# Patient Record
Sex: FEMALE | Employment: UNEMPLOYED | URBAN - METROPOLITAN AREA
[De-identification: names, ages, dates, MRNs, and addresses within clinical notes are randomized per-mention and may not be internally consistent; named-entity substitution may affect disease eponyms.]

---

## 2017-05-15 ENCOUNTER — HOSPITAL ENCOUNTER (OUTPATIENT)
Facility: CLINIC | Age: 37
End: 2017-05-15
Attending: FAMILY MEDICINE | Admitting: FAMILY MEDICINE

## 2017-06-06 ENCOUNTER — PRE VISIT (OUTPATIENT)
Dept: MATERNAL FETAL MEDICINE | Facility: CLINIC | Age: 37
End: 2017-06-06

## 2017-06-09 ENCOUNTER — OFFICE VISIT (OUTPATIENT)
Dept: MATERNAL FETAL MEDICINE | Facility: CLINIC | Age: 37
End: 2017-06-09
Attending: ADVANCED PRACTICE MIDWIFE
Payer: MEDICAID

## 2017-06-09 ENCOUNTER — HOSPITAL ENCOUNTER (OUTPATIENT)
Dept: ULTRASOUND IMAGING | Facility: CLINIC | Age: 37
Discharge: HOME | End: 2017-06-09
Attending: ADVANCED PRACTICE MIDWIFE | Admitting: OBSTETRICS & GYNECOLOGY
Payer: MEDICAID

## 2017-06-09 DIAGNOSIS — O09.521 AMA (ADVANCED MATERNAL AGE) MULTIGRAVIDA 35+, FIRST TRIMESTER: Primary | ICD-10-CM

## 2017-06-09 DIAGNOSIS — O26.90 PREGNANCY RELATED CONDITION: ICD-10-CM

## 2017-06-09 DIAGNOSIS — Z84.3 FAMILY HISTORY OF CONSANGUINITY: ICD-10-CM

## 2017-06-09 PROCEDURE — 76813 OB US NUCHAL MEAS 1 GEST: CPT

## 2017-06-09 PROCEDURE — 96040 ZZH GENETIC COUNSELING, EACH 30 MINUTES: CPT | Mod: ZF | Performed by: GENETIC COUNSELOR, MS

## 2017-06-09 NOTE — MR AVS SNAPSHOT
"              After Visit Summary   2017    Christine Glasgow    MRN: [id number]           Patient Information     Date Of Birth          1980        Visit Information        Provider Department      2017 12:45 PM Shayla Perla [facility] Maternal Fetal [facility]        Today's Diagnoses     AMA (advanced maternal age) multigravida 35+, first trimester    -  1    Pregnancy related condition        Family history of consanguinity           Follow-ups after your visit        Who to contact     If you have questions or need follow up information about today's clinic visit or your schedule please contact [facility] MATERNAL FETAL [facility] directly at [phone].  Normal or non-critical lab and imaging results will be communicated to you by OneWirehart, letter or phone within 4 business days after the clinic has received the results. If you do not hear from us within 7 days, please contact the clinic through OneWirehart or phone. If you have a critical or abnormal lab result, we will notify you by phone as soon as possible.  Submit refill requests through [employer] or call your pharmacy and they will forward the refill request to us. Please allow 3 business days for your refill to be completed.          Additional Information About Your Visit        OneWirehart Information     [employer] lets you send messages to your doctor, view your test results, renew your prescriptions, schedule appointments and more. To sign up, go to [email or web address] . Click on \"Log in\" on the left side of the screen, which will take you to the Welcome page. Then click on \"Sign up Now\" on the right side of the page.     You will be asked to enter the access code listed below, as well as some personal information. Please follow the directions to create your username and password.     Your access code is: [email or web address]  Expires: 2017  4:48 PM     Your access code will  in 90 days. If you need help or a " new code, please call your [place] clinic or [phone].        Care EveryWhere ID     This is your Care EveryWhere ID. This could be used by other organizations to access your [place] medical records  [id number]        Your Vitals Were     Last Period                   02/28/2017            Blood Pressure from Last 3 Encounters:   No data found for BP    Weight from Last 3 Encounters:   No data found for Wt              We Performed the Following     [facility] Genetic Counseling        Primary Care Provider Office Phone # Fax #    Sheryl Mary Delgado [phone]       [facility] [address] [place], [place] [address]E [facility] [address]        Thank you!     Thank you for choosing [facility]  for your care. Our goal is always to provide you with excellent care. Hearing back from our patients is one way we can continue to improve our services. Please take a few minutes to complete the written survey that you may receive in the mail after your visit with us. Thank you!             Your Updated Medication List - Protect others around you: Learn how to safely use, store and throw away your medicines at www.disposemymeds.org.      Notice  As of 6/9/2017  4:48 PM    You have not been prescribed any medications.

## 2017-06-09 NOTE — MR AVS SNAPSHOT
"              After Visit Summary   2017    Christine Glasgow    MRN: [id number]           Patient Information     Date Of Birth          1980        Visit Information        Provider Department      2017 2:00 PM Telma Olmos MD [facility] Maternal Fetal Medicine [facility]        Today's Diagnoses     AMA (advanced maternal age) multigravida 35+, first trimester    -  1       Follow-ups after your visit        Who to contact     If you have questions or need follow up information about today's clinic visit or your schedule please contact [facility] MATERNAL FETAL MEDICINE [facility] directly at [phone].  Normal or non-critical lab and imaging results will be communicated to you by [employer], letter or phone within 4 business days after the clinic has received the results. If you do not hear from us within 7 days, please contact the clinic through [employer] or phone. If you have a critical or abnormal lab result, we will notify you by phone as soon as possible.  Submit refill requests through [employer] or call your pharmacy and they will forward the refill request to us. Please allow 3 business days for your refill to be completed.          Additional Information About Your Visit        MyChart Information     Freespee lets you send messages to your doctor, view your test results, renew your prescriptions, schedule appointments and more. To sign up, go to [email or web address] . Click on \"Log in\" on the left side of the screen, which will take you to the Welcome page. Then click on \"Sign up Now\" on the right side of the page.     You will be asked to enter the access code listed below, as well as some personal information. Please follow the directions to create your username and password.     Your access code is: [email or web address]  Expires: 2017  4:48 PM     Your access code will  in 90 days. If you need help or a new code, please call your [place] clinic or [phone].        Care " EveryWhere ID     This is your Care EveryWhere ID. This could be used by other organizations to access your [place] medical records  [id number]        Your Vitals Were     Last Period                   02/28/2017            Blood Pressure from Last 3 Encounters:   No data found for BP    Weight from Last 3 Encounters:   No data found for Wt              Today, you had the following     No orders found for display       Primary Care Provider Office Phone # Fax #    Sheryl Mary RossichiquitaMINH alejandro [phone]       [facility] [address] [facility] [address]        Thank you!     Thank you for choosing [facility]  for your care. Our goal is always to provide you with excellent care. Hearing back from our patients is one way we can continue to improve our services. Please take a few minutes to complete the written survey that you may receive in the mail after your visit with us. Thank you!             Your Updated Medication List - Protect others around you: Learn how to safely use, store and throw away your medicines at www.disposemymeds.org.      Notice  As of 6/9/2017 11:59 PM    You have not been prescribed any medications.

## 2017-06-09 NOTE — PROGRESS NOTES
[facility] [place]  Genetic Counseling Consult    Patient: Christine Glasgow YOB: 1980   Date of Service: 17      Christine Glasgow was seen, along with her  Tad, with the aid of a  at [facility] [place] for genetic consultation to discuss the options for screening and testing for fetal chromosome abnormalities.  The indication for genetic counseling is advanced maternal age.        Impression/Plan:   1.  Christine had NT only assessment today.  She decided through our genetic counselling session that she would not be interested in prenatal diagnosis in a pregnancy and was worried that serum screening would return results that would cause unnecessary anxiety.  Therefore, she decided to proceed with ultrasound only.    2.  Maternal serum AFP (single marker screen) is recommended after 15 weeks to screen for open neural tube defects. A quad screen should not be performed.    3.  An 18-20 week comprehensive ultrasound is standard of care for all women 35 or older at delivery.    Pregnancy History:   /Parity:    Age at Delivery: 37 year old  TRISTAN: 2017, by Ultrasound  Gestational Age: 13w1d    No significant complications or exposures were reported in the current pregnancy.    Medical History:   Christine todd reported medical history is not expected to impact pregnancy management or risks to fetal development.       Family History:   A three-generation pedigree was obtained, and is scanned under the  Media  tab.   The following significant findings were reported by Christine:    Consanguinity was reported as Christine stated that she believes her maternal grandmother and Tad' paternal grandmother are first cousins.  This would make Christine and Tad 3rd cousins.  We discussed the increased possibility of two individuals both being carriers of the same autosomal recessive genetic condition when they  share a common ancestor. The option of expanded carrier screening was reviewed and declined.  We also reviewed that there is no increased empiric risk of birth defects/intellectual disability in children of individuals who are third cousins.      Otherwise, the reported family history is negative for multiple miscarriages, stillbirths, birth defects, mental retardation, known genetic conditions, and consanguinity.       Carrier Screening:   The patient reports that she and the father of the pregnancy have [place] ancestry:    Hemoglobinopathies/thalassemias are autosomal recessive genetic condition that occurs with increased frequency in individuals of this ancestry and carrier screening for this condition is available.  In addition,  screening in the [facility] includes these conditions.    Expanded carrier screening for mutations in a large panel of genes associated with autosomal recessive conditions including cystic fibrosis, spinal muscular atrophy, and others, is now available.      The patient has declined the carrier screening options reviewed today.       Risk Assessment for Chromosome Conditions:   We explained that the risk for fetal chromosome abnormalities increases with maternal age. We discussed specific features of common chromosome abnormalities, including Down syndrome, trisomy 13, trisomy 18, and sex chromosome trisomies.      - At age 36 at midtrimester, the risk to have a baby with Down syndrome is 1 in 216.     - At age 36 at midtrimester, the risk to have a baby with any chromosome abnormality is 1 in 105.          Testing Options:   We discussed the following options:   First trimester screening    First trimester ultrasound with nuchal translucency and nasal bone assessments, maternal plasma hCG, SAM-A, and AFP measurement    Screens for fetal trisomy 21, trisomy 13, and trisomy 18    Cannot screen for open neural tube defects; maternal serum AFP after 15 weeks is  recommended     Non-invasive Prenatal Testing (NIPT)    Maternal plasma cell-free DNA testing; first trimester ultrasound with nuchal translucency and nasal bone assessment is recommended, when appropriate    Screens for fetal trisomy 21, trisomy 13, trisomy 18, and sex chromosome aneuploidy    Cannot screen for open neural tube defects; maternal serum AFP after 15 weeks is recommended     Genetic Amniocentesis    Invasive procedure typically performed in the second trimester by which amniotic fluid is obtained for the purpose of chromosome analysis and/or other prenatal genetic analysis    Diagnostic results; >99% sensitivity for fetal chromosome abnormalities    AFAFP measurement tests for open neural tube defects     Comprehensive (Level II) ultrasound: Detailed ultrasound performed between 18-22 weeks gestation to screen for major birth defects and markers for aneuploidy.        We reviewed the benefits and limitations of this testing.  Screening tests provide a risk assessment specific to the pregnancy for certain fetal chromosome abnormalities, but cannot definitively diagnose or exclude a fetal chromosome abnormality.  Follow-up genetic counseling and consideration of diagnostic testing is recommended with any abnormal screening result.     Diagnostic tests carry inherent risks- including risk of miscarriage- that require careful consideration.  These tests can detect fetal chromosome abnormalities with greater than 99% certainty.  Results can be compromised by maternal cell contamination or mosaicism, and are limited by the resolution of cytogenetic G-banding technology.  There is no screening nor diagnostic test that can detect all forms of birth defects or mental disability.     It was a pleasure to be involved with [facility] care. Face-to-face time of the meeting was 45 minutes.    Shayla Perla, [facility]  Certified Genetic Counselor  Pager: [phone]

## 2017-07-28 ENCOUNTER — HOSPITAL ENCOUNTER (OUTPATIENT)
Dept: ULTRASOUND IMAGING | Facility: CLINIC | Age: 37
Discharge: HOME | End: 2017-07-28
Attending: ADVANCED PRACTICE MIDWIFE | Admitting: OBSTETRICS & GYNECOLOGY
Payer: COMMERCIAL

## 2017-07-28 ENCOUNTER — OFFICE VISIT (OUTPATIENT)
Dept: MATERNAL FETAL MEDICINE | Facility: CLINIC | Age: 37
End: 2017-07-28
Attending: ADVANCED PRACTICE MIDWIFE
Payer: COMMERCIAL

## 2017-07-28 ENCOUNTER — OFFICE VISIT (OUTPATIENT)
Dept: INTERPRETER SERVICES | Facility: CLINIC | Age: 37
End: 2017-07-28

## 2017-07-28 DIAGNOSIS — O26.90 PREGNANCY RELATED CONDITION, UNSPECIFIED TRIMESTER: ICD-10-CM

## 2017-07-28 DIAGNOSIS — O09.522 AMA (ADVANCED MATERNAL AGE) MULTIGRAVIDA 35+, SECOND TRIMESTER: Primary | ICD-10-CM

## 2017-07-28 PROCEDURE — T1013 SIGN LANG/ORAL INTERPRETER: HCPCS | Mod: U3,ZF

## 2017-07-28 PROCEDURE — 76811 OB US DETAILED SNGL FETUS: CPT

## 2017-07-28 NOTE — PROGRESS NOTES
Please see the imaging tab for details of the ultrasound performed today.    Ruth Romero MD  Specialist in Maternal-Fetal Medicine

## 2017-07-28 NOTE — MR AVS SNAPSHOT
"              After Visit Summary   2017    Christine Glasgow    MRN: [id number]           Patient Information     Date Of Birth          1980        Visit Information        Provider Department      2017 12:15 PM Ruth Romero MD [facility] Maternal Fetal Medicine [facility]        Today's Diagnoses     AMA (advanced maternal age) multigravida 35+, second trimester    -  1       Follow-ups after your visit        Who to contact     If you have questions or need follow up information about today's clinic visit or your schedule please contact [facility] MATERNAL FETAL MEDICINE [facility] directly at [phone].  Normal or non-critical lab and imaging results will be communicated to you by MyChart, letter or phone within 4 business days after the clinic has received the results. If you do not hear from us within 7 days, please contact the clinic through [employer] or phone. If you have a critical or abnormal lab result, we will notify you by phone as soon as possible.  Submit refill requests through [employer] or call your pharmacy and they will forward the refill request to us. Please allow 3 business days for your refill to be completed.          Additional Information About Your Visit        MyChart Information     [employer] lets you send messages to your doctor, view your test results, renew your prescriptions, schedule appointments and more. To sign up, go to [email or web address] . Click on \"Log in\" on the left side of the screen, which will take you to the Welcome page. Then click on \"Sign up Now\" on the right side of the page.     You will be asked to enter the access code listed below, as well as some personal information. Please follow the directions to create your username and password.     Your access code is: [email or web address]  Expires: 2017  4:48 PM     Your access code will  in 90 days. If you need help or a new code, please call your [place] clinic or [phone].        Care " EveryWhere ID     This is your Care EveryWhere ID. This could be used by other organizations to access your [place] medical records  [id number]        Your Vitals Were     Last Period                   02/28/2017            Blood Pressure from Last 3 Encounters:   No data found for BP    Weight from Last 3 Encounters:   No data found for Wt              Today, you had the following     No orders found for display       Primary Care Provider Office Phone # Fax #    Sheryl Delgado, [facility] [phone]       [facility] [address]  [facility] [address]        Equal Access to Services     [facility]: Hadii aad ku hadasho Soomaali, waaxda luqadaha, qaybta kaalmada adeegyada, waxay ramanin hayaan adeedi blount . So [facility] [phone].    ATENCIÓN: Si habla español, tiene a wilson disposición servicios gratuitos de asistencia lingüística. Paige al [phone].    We comply with applicable federal civil rights laws and [place] laws. We do not discriminate on the basis of race, color, national origin, age, disability sex, sexual orientation or gender identity.            Thank you!     Thank you for choosing [facility]  for your care. Our goal is always to provide you with excellent care. Hearing back from our patients is one way we can continue to improve our services. Please take a few minutes to complete the written survey that you may receive in the mail after your visit with us. Thank you!             Your Updated Medication List - Protect others around you: Learn how to safely use, store and throw away your medicines at www.disposemymeds.org.      Notice  As of 7/28/2017 12:52 PM    You have not been prescribed any medications.

## 2017-11-10 ENCOUNTER — HOSPITAL ENCOUNTER (OUTPATIENT)
Facility: CLINIC | Age: 37
Discharge: HOME | End: 2017-11-11
Attending: FAMILY MEDICINE | Admitting: FAMILY MEDICINE
Payer: COMMERCIAL

## 2017-11-10 ENCOUNTER — INTERPRETER (OUTPATIENT)
Dept: INTERPRETER SERVICES | Facility: CLINIC | Age: 37
End: 2017-11-10

## 2017-11-10 VITALS
SYSTOLIC BLOOD PRESSURE: 103 MMHG | RESPIRATION RATE: 18 BRPM | HEART RATE: 70 BPM | TEMPERATURE: 98 F | DIASTOLIC BLOOD PRESSURE: 63 MMHG

## 2017-11-10 DIAGNOSIS — R82.71 GBS BACTERIURIA: Primary | ICD-10-CM

## 2017-11-10 PROBLEM — Z36.89 ENCOUNTER FOR TRIAGE IN PREGNANT PATIENT: Status: ACTIVE | Noted: 2017-11-10

## 2017-11-10 LAB
ALBUMIN UR-MCNC: NEGATIVE MG/DL
APPEARANCE UR: CLEAR
BACTERIA #/AREA URNS HPF: ABNORMAL /HPF
BILIRUB UR QL STRIP: NEGATIVE
COLOR UR AUTO: YELLOW
GLUCOSE UR STRIP-MCNC: 150 MG/DL
HGB UR QL STRIP: NEGATIVE
KETONES UR STRIP-MCNC: NEGATIVE MG/DL
LEUKOCYTE ESTERASE UR QL STRIP: NEGATIVE
MUCOUS THREADS #/AREA URNS LPF: PRESENT /LPF
NITRATE UR QL: NEGATIVE
PH UR STRIP: 6 PH (ref 5–7)
RBC #/AREA URNS AUTO: <1 /HPF (ref 0–2)
SOURCE: ABNORMAL
SP GR UR STRIP: 1.01 (ref 1–1.03)
SPECIMEN SOURCE: NORMAL
SQUAMOUS #/AREA URNS AUTO: 1 /HPF (ref 0–1)
UROBILINOGEN UR STRIP-MCNC: NORMAL MG/DL (ref 0–2)
WBC #/AREA URNS AUTO: <1 /HPF (ref 0–2)
WET PREP SPEC: NORMAL

## 2017-11-10 PROCEDURE — 81001 URINALYSIS AUTO W/SCOPE: CPT | Performed by: ADVANCED PRACTICE MIDWIFE

## 2017-11-10 PROCEDURE — 87210 SMEAR WET MOUNT SALINE/INK: CPT | Performed by: FAMILY MEDICINE

## 2017-11-10 PROCEDURE — T1013 SIGN LANG/ORAL INTERPRETER: HCPCS | Mod: U3

## 2017-11-10 PROCEDURE — 87491 CHLMYD TRACH DNA AMP PROBE: CPT | Performed by: FAMILY MEDICINE

## 2017-11-10 PROCEDURE — 25000128 H RX IP 250 OP 636: Performed by: FAMILY MEDICINE

## 2017-11-10 PROCEDURE — 96374 THER/PROPH/DIAG INJ IV PUSH: CPT

## 2017-11-10 PROCEDURE — 87591 N.GONORRHOEAE DNA AMP PROB: CPT | Performed by: FAMILY MEDICINE

## 2017-11-10 PROCEDURE — 99214 OFFICE O/P EST MOD 30 MIN: CPT | Mod: 25

## 2017-11-10 PROCEDURE — 96360 HYDRATION IV INFUSION INIT: CPT

## 2017-11-10 PROCEDURE — 59025 FETAL NON-STRESS TEST: CPT

## 2017-11-10 RX ORDER — AMPICILLIN 500 MG/1
500 INJECTION, POWDER, FOR SOLUTION INTRAMUSCULAR; INTRAVENOUS ONCE
Status: COMPLETED | OUTPATIENT
Start: 2017-11-10 | End: 2017-11-10

## 2017-11-10 RX ORDER — ONDANSETRON 2 MG/ML
4 INJECTION INTRAMUSCULAR; INTRAVENOUS EVERY 6 HOURS PRN
Status: DISCONTINUED | OUTPATIENT
Start: 2017-11-10 | End: 2017-11-11 | Stop reason: HOSPADM

## 2017-11-10 RX ORDER — LIDOCAINE 40 MG/G
CREAM TOPICAL
Status: DISCONTINUED | OUTPATIENT
Start: 2017-11-10 | End: 2017-11-11 | Stop reason: HOSPADM

## 2017-11-10 RX ORDER — AMOXICILLIN 500 MG/1
500 CAPSULE ORAL 2 TIMES DAILY
Qty: 10 CAPSULE | Refills: 0 | Status: SHIPPED | OUTPATIENT
Start: 2017-11-10 | End: 2017-11-15

## 2017-11-10 RX ADMIN — SODIUM CHLORIDE, POTASSIUM CHLORIDE, SODIUM LACTATE AND CALCIUM CHLORIDE 500 ML: 600; 310; 30; 20 INJECTION, SOLUTION INTRAVENOUS at 22:12

## 2017-11-10 RX ADMIN — AMPICILLIN SODIUM 500 MG: 500 INJECTION, POWDER, FOR SOLUTION INTRAMUSCULAR; INTRAVENOUS at 22:51

## 2017-11-10 NOTE — IP AVS SNAPSHOT
MRN:[id number]                      After Visit Summary   11/10/2017    Christine Glasgow    MRN: [id number]           Thank you!     Thank you for choosing [facility] for your care. Our goal is always to provide you with excellent care. Hearing back from our patients is one way we can continue to improve our services. Please take a few minutes to complete the written survey that you may receive in the mail after you visit with us. Thank you!        Patient Information     Date Of Birth          1980        Designated Caregiver       Most Recent Value    Caregiver    Will someone help with your care after discharge? no      About your hospital stay     You were admitted on:  November 10, 2017 You last received care in the:  [facility]    You were discharged on:  2017       Who to Call     For medical emergencies, please call 911.  For non-urgent questions about your medical care, please call your primary care provider or clinic, [phone]          Attending Provider     Provider Specialty    Sofía Saxena MD Family Practice       Primary Care Provider Office Phone # Fax #    Sherylvolodymyr Dlegado [phone]      Further instructions from your care team       [facility]: [facility] .   Reason for maternal/fetal assessment per patient is Rule Out Labor  . Patient is a . Prenatal record reviewed.      Obstetric History       T3      L3     SAB0   TAB0   Ectopic0   Multiple0   Live Births0       # Outcome Date GA Lbr Leonel/2nd Weight Sex Delivery Anes PTL Lv   4 Current            3 Term            2 Term            1 Term                  Medical History: History reviewed. No pertinent past medical history.. Gestational Age 35w1d. VSS. Cervix: dilated to 3.5.  Fetal movement present. Patient denies cramping, vaginal discharge, pelvic pressure, GI problems, bloody show, vaginal bleeding, edema, headache, visual  "disturbances, epigastric or URQ pain, abdominal pain, rupture of membranes. Support persons present.  Action: Verbal consent for EFM. Triage assessment completed. EFM and [place] applied. Fetal assessment: Presumed adequate fetal oxygenation documented (see flow record). Patient instructed to report change in fetal movement, vaginal leaking of fluid or bleeding, abdominal pain, or any concerns related to the pregnancy to her nurse/physician.   Response: Dr. Saxena informed of patient arrival. Plan per provider is discharge home with oral antibiotics. Patient verbalized understanding of education and verbalized agreement with plan. Discharged ambulatory at  0030.      Pending Results     Date and Time Order Name Status Description    11/10/2017 2111 Chlamydia trachomatis PCR In process     11/10/2017 2111 Neisseria gonorrhoea PCR In process             Admission Information     Date & Time Provider Department Dept. Phone    11/10/2017 Sofía Saxena MD [facility] [phone]      Your Vitals Were     Blood Pressure Pulse Temperature Respirations Last Period       103/63 70 98  F (36.7  C) (Oral) 18 2017       PushCoin Information     PushCoin lets you send messages to your doctor, view your test results, renew your prescriptions, schedule appointments and more. To sign up, go to [email or web address] . Click on \"Log in\" on the left side of the screen, which will take you to the Welcome page. Then click on \"Sign up Now\" on the right side of the page.     You will be asked to enter the access code listed below, as well as some personal information. Please follow the directions to create your username and password.     Your access code is: [email or web address]  Expires: 2018 12:09 AM     Your access code will  in 90 days. If you need help or a new code, please call your [facility] or [phone].        Care EveryWhere ID     This is your Care EveryWhere ID. This could be used by other organizations to " access your [facility] medical records  [id number]        Equal Access to Services     ELIZABETHBERNARDINO JORGE : Hadii nydia schmid sallyjair Sorosales, waaxda luqadaha, qaybta kanayelida faithtarikkev, kathy dean jeromyfrandy troncosochristinabrigitte earl. So [facility] [phone].    ATENCIÓN: Si habla español, tiene a wilson disposición servicios gratuitos de asistencia lingüística. Llame al [phone].    We comply with applicable [facility] civil rights laws and [place] laws. We do not discriminate on the basis of race, color, national origin, age, disability, sex, sexual orientation, or gender identity.               Review of your medicines      START taking        Dose / Directions    amoxicillin 500 MG capsule   Commonly known as:  AMOXIL        Dose:  500 mg   Take 1 capsule (500 mg) by mouth 2 times daily for 5 days   Quantity:  10 capsule   Refills:  0            Where to get your medicines      These medications were sent to [facility] [address]  [address] [address]     Phone:  [phone]     amoxicillin 500 MG capsule               ANTIBIOTIC INSTRUCTION     You've Been Prescribed an Antibiotic - Now What?  Your healthcare team thinks that you or your loved one might have an infection. Some infections can be treated with antibiotics, which are powerful, life-saving drugs. Like all medications, antibiotics have side effects and should only be used when necessary. There are some important things you should know about your antibiotic treatment.      Your healthcare team may run tests before you start taking an antibiotic.    Your team may take samples (e.g., from your blood, urine or other areas) to run tests to look for bacteria. These test can be important to determine if you need an antibiotic at all and, if you do, which antibiotic will work best.      Within a few days, your healthcare team might change or even stop your antibiotic.    Your team may start you on an antibiotic  while they are working to find out what is making you sick.    Your team might change your antibiotic because test results show that a different antibiotic would be better to treat your infection.    In some cases, once your team has more information, they learn that you do not need an antibiotic at all. They may find out that you don't have an infection, or that the antibiotic you're taking won't work against your infection. For example, an infection caused by a virus can't be treated with antibiotics. Staying on an antibiotic when you don't need it is more likely to be harmful than helpful.      You may experience side effects from your antibiotic.    Like all medications, antibiotics have side effects. Some of these can be serious.    Let you healthcare team know if you have any known allergies when you are admitted to the hospital.    One significant side effect of nearly all antibiotics is the risk of severe and sometimes deadly diarrhea caused by Clostridium difficile (C. Difficile). This occurs when a person takes antibiotics because some good germs are destroyed. Antibiotic use allows C. diificile to take over, putting patients at high risk for this serious infection.    As a patient or caregiver, it is important to understand your or your loved one's antibiotic treatment. It is especially important for caregivers to speak up when patients can't speak for themselves. Here are some important questions to ask your healthcare team.    What infection is this antibiotic treating and how do you know I have that infection?    What side effects might occur from this antibiotic?    How long will I need to take this antibiotic?    Is it safe to take this antibiotic with other medications or supplements (e.g., vitamins) that I am taking?     Are there any special directions I need to know about taking this antibiotic? For example, should I take it with food?    How will I be monitored to know whether my infection is  responding to the antibiotic?    What tests may help to make sure the right antibiotic is prescribed for me?      Information provided by:  www.cdc.gov/getsmart  U.S. Department of Health and Human Services  Centers for disease Control and Prevention  National Center for Emerging and Zoonotic Infectious Diseases  Division of Healthcare Quality Promotion         Protect others around you: Learn how to safely use, store and throw away your medicines at www.disposemymeds.org.             Medication List: This is a list of all your medications and when to take them. Check marks below indicate your daily home schedule. Keep this list as a reference.      Medications           Morning Afternoon Evening Bedtime As Needed    amoxicillin 500 MG capsule   Commonly known as:  AMOXIL   Take 1 capsule (500 mg) by mouth 2 times daily for 5 days

## 2017-11-10 NOTE — IP AVS SNAPSHOT
[facility]    [address]    [place] [address]    Phone:  [phone]                                       After Visit Summary   11/10/2017    Christine Glasgow    MRN: [id number]           After Visit Summary Signature Page     I have received my discharge instructions, and my questions have been answered. I have discussed any challenges I see with this plan with the nurse or doctor.    ..........................................................................................................................................  Patient/Patient Representative Signature      ..........................................................................................................................................  Patient Representative Print Name and Relationship to Patient    ..................................................               ................................................  Date                                            Time    ..........................................................................................................................................  Reviewed by Signature/Title    ...................................................              ..............................................  Date                                                            Time

## 2017-11-11 PROBLEM — R82.71 GBS BACTERIURIA: Status: ACTIVE | Noted: 2017-11-11

## 2017-11-11 NOTE — PROVIDER NOTIFICATION
"   11/10/17 2300   Provider Notification   Provider Name/Title Dr. Saxena   Method of Notification Phone   Request Evaluate in Person   Notification Reason Status Update   IV antibiotics are now infusing. Pt states pain is less, less contractions seen on monitor. States she still has back pain but declines intervention; \"it's not that bad\". FHR cat 1. Pt educated on PTL and when to seek medical attention.  used for all teaching and patient care.   "

## 2017-11-11 NOTE — PROGRESS NOTES
Data: Patient presented to [facility] at .   Reason for maternal/fetal assessment per patient is Rule Out Labor  .  Patient is a . Prenatal record reviewed.      Obstetric History       T3      L3     SAB0   TAB0   Ectopic0   Multiple0   Live Births0       # Outcome Date GA Lbr Leonel/2nd Weight Sex Delivery Anes PTL Lv   4 Current            3 Term            2 Term            1 Term               . Medical history: History reviewed. No pertinent past medical history.. Gestational Age 35w1d. VSS. Fetal movement present. Patient states she was in the clinic today and her cervix was 3-4cm. Contractions started today and are getting more painful. She feels them in her lower abdomen and are more painful than menstrual cramps. Patient denies  backache, vaginal discharge, pelvic pressure, UTI symptoms, GI problems, bloody show, vaginal bleeding, edema, headache, visual disturbances, epigastric or URQ pain, rupture of membranes. Support persons present.  Action: Verbal consent for EFM. Triage assessment completed. EFM applied for  labor assessment. Uterine assessment TOCO. Fetal assessment: Presumed adequate fetal oxygenation documented (see flow record). Wet prep, urine, and GC/Clam collected and sent to lab.  Response: Dr. Saxena informed of patient arrival. Plan per provider is IV fluids and IV antibiotics to treat UTI. Patient verbalized agreement with plan. Patient  oriented to room and call light.

## 2017-11-11 NOTE — DISCHARGE INSTRUCTIONS
Data: [facility] .   Reason for maternal/fetal assessment per patient is Rule Out Labor  . Patient is a . Prenatal record reviewed.      Obstetric History       T3      L3     SAB0   TAB0   Ectopic0   Multiple0   Live Births0       # Outcome Date GA Lbr Leonel/2nd Weight Sex Delivery Anes PTL Lv   4 Current            3 Term            2 Term            1 Term                  Medical History: History reviewed. No pertinent past medical history.. Gestational Age 35w1d. VSS. Cervix: dilated to 3.5.  Fetal movement present. Patient denies cramping, vaginal discharge, pelvic pressure, GI problems, bloody show, vaginal bleeding, edema, headache, visual disturbances, epigastric or URQ pain, abdominal pain, rupture of membranes. Support persons present.  Action: Verbal consent for EFM. Triage assessment completed. EFM and [place] applied. Fetal assessment: Presumed adequate fetal oxygenation documented (see flow record). Patient instructed to report change in fetal movement, vaginal leaking of fluid or bleeding, abdominal pain, or any concerns related to the pregnancy to her nurse/physician.   Response: Dr. Saxena informed of patient arrival. Plan per provider is discharge home with oral antibiotics. Patient verbalized understanding of education and verbalized agreement with plan. Discharged ambulatory at  0030.

## 2017-11-11 NOTE — PROGRESS NOTES
[facility]  Ob Admit /Triage Note    Christine Glasgow MRN# [id number]   Age: 37 year old YOB: 1980     Date of Admission: 11/10/2017 10:31 PM  Date of service: 11/10/2017.       History of Present Illness (Resident / Clinician):   Christine Glasgow is a patient of Sheryl Delgado from  [facility].   She is a 37 year old  who is 35w1d pregnant with TRISTAN  Dec 14, 2017, by 8w0d ultrasound.     [facility] for [facility] out  labor.   She reports that she was seen in clinic earlier today and was found to be 3-4cm on a routine cervical check. Wet prep was negative.   She reports irregular mild contractions. She denies fluid leakage. She denies bleeding per vagina. Fetal movement is .normal. She has been experiencing urinary urgency since last week. Last week on  she presented to clinic with vaginal itching and dysuria, she was treated for a yeast infection with PO diflucan x1. Urine culture was also obtained. UC grew GBS and there was an attempt to communicate this result and the subsequent antibiotic prescription with patient, but patient never got the message and has not taken any antibiotics other than the diflucan. The dysuria has improved but the urinary urgency has persisted.     Her prenatal course has been uncomplicated.  She has no history of  labor or  birth. All previous deliveries were vaginal.     Visit performed with assistance of professional Thai interpretor.   Patient accompanied by her spouse and daughter    Prenatal labs   No results found for: ABO, RH, AS, HEPBANG, CHPCRT, GCPCRT, TREPAB, RUBELLAABIGG, HGB, HIV, GBS    GBS swab does not need to be collected because of positive UC on 17, she will need prophylactic antibiotics in labor         Obstetrical History:   She is a 37 year old   Her OB history:   Obstetric History       T3      L3      SAB0   TAB0   Ectopic0   Multiple0   Live Births0       # Outcome Date GA Lbr Leonel/2nd Weight Sex Delivery Anes PTL Lv   4 Current            3 Term            2 Term            1 Term                      Medications:     No current facility-administered medications on file prior to encounter.   No current outpatient prescriptions on file prior to encounter.         Allergies:   Review of patient's allergies indicates not on file.         Review of Systems:   CONSTITUTIONAL: denies fevers or fatigue  RESP: no significant cough, dyspnea with eating  CV: no chest pain, no palpitations, no new or worsening peripheral edema  GI: no nausea, no vomiting, no constipation, no diarrhea  : +  Urgency/frequency, no dysuria, no hematuria  MSKL: lower back pain         Physical Exam:   Vitals:   /63  Pulse 70  Temp 98  F (36.7  C) (Oral)  Resp 18  LMP 2017  0 lbs 0 oz  There is no height or weight on file to calculate BMI.    GEN: Awake, alert in no apparent distress   HEENT: grossly normal  BACK:  no costovertebral angle tenderness   ABDOMEN: gravid  PELVIC:  no fluid noted, no blood noted.  Cervix: 3/40/-3  EFW on Exam: 6lb    NST interpretation:  Ordered by  [employer]  Start [id number]   Stop time 2225  External Monitor, FHT: Baseline 140 bpm. Variability is  moderate (5-25 bpm),  Accelerations are Present, decelerations are Absent ., TOCO: Contractions every 2-11 w/ irritability. Category 1   Interpretation: reactive      Assessment and Plan:   Assessment:   Christine Glasgow is a 37 year old  at 35w1d not in labor.   Patient Active Problem List   Diagnosis     Encounter for triage in pregnant patient         Plan:   Rule out  labor  No cervical change from 2pm to 9pm, still 3cm  - Hydrated with 500ml LR and contractions/pain improved  - instructed to keep hydrated with PO fluids    GBS Bacteruria  Despite negative UA today and resolution of dysuria, her bacteruria was never treated, so she  was given 1 dose of 500mg ampicillin inpatient and discharged with 5 days of BID amoxicillin 500mg    Sofía Saxena MD   [facility]

## 2017-11-11 NOTE — PLAN OF CARE
Christine Glasgow admitted for evaluation and treatment of  Labor .    VitalsBlood pressure 103/63, pulse 70, temperature 98  F (36.7  C), temperature source Oral, resp. rate 18, last menstrual period 2017.  Fetal status: Reactive.  UA tests were done, results: negative.    Medications given during stay: 1 dose of 500 mg ampicillin inpatient and discharged with 5 days of BID amoxicillin 500 mg.   Written instructions given to patient. Copy in electronic medical record.  Discharged Home undelivered at 12:30 am in stable condition.  Patient will follow up with primary care provider at next scheduled appointment.

## 2017-11-12 LAB
C TRACH DNA SPEC QL NAA+PROBE: NEGATIVE
N GONORRHOEA DNA SPEC QL NAA+PROBE: NEGATIVE
SPECIMEN SOURCE: NORMAL
SPECIMEN SOURCE: NORMAL

## 2017-12-08 ENCOUNTER — HOSPITAL ENCOUNTER (INPATIENT)
Facility: CLINIC | Age: 37
LOS: 2 days | Discharge: HOME HEALTH | End: 2017-12-10
Attending: FAMILY MEDICINE | Admitting: FAMILY MEDICINE
Payer: COMMERCIAL

## 2017-12-08 ENCOUNTER — APPOINTMENT (OUTPATIENT)
Dept: INTERPRETER SERVICES | Facility: CLINIC | Age: 37
End: 2017-12-08
Payer: COMMERCIAL

## 2017-12-08 ENCOUNTER — OFFICE VISIT (OUTPATIENT)
Dept: INTERPRETER SERVICES | Facility: CLINIC | Age: 37
End: 2017-12-08
Payer: COMMERCIAL

## 2017-12-08 PROBLEM — Z37.9 NORMAL LABOR: Status: ACTIVE | Noted: 2017-12-08

## 2017-12-08 LAB
ABO + RH BLD: NORMAL
ABO + RH BLD: NORMAL
BASOPHILS # BLD AUTO: 0 10E9/L (ref 0–0.2)
BASOPHILS NFR BLD AUTO: 0 %
DIFFERENTIAL METHOD BLD: NORMAL
EOSINOPHIL # BLD AUTO: 0 10E9/L (ref 0–0.7)
EOSINOPHIL NFR BLD AUTO: 0.4 %
ERYTHROCYTE [DISTWIDTH] IN BLOOD BY AUTOMATED COUNT: 12.7 % (ref 10–15)
HCT VFR BLD AUTO: 36.7 % (ref 35–47)
HGB BLD-MCNC: 12.4 G/DL (ref 11.7–15.7)
IMM GRANULOCYTES # BLD: 0 10E9/L (ref 0–0.4)
IMM GRANULOCYTES NFR BLD: 0.2 %
LYMPHOCYTES # BLD AUTO: 1.2 10E9/L (ref 0.8–5.3)
LYMPHOCYTES NFR BLD AUTO: 14.9 %
MCH RBC QN AUTO: 31.2 PG (ref 26.5–33)
MCHC RBC AUTO-ENTMCNC: 33.8 G/DL (ref 31.5–36.5)
MCV RBC AUTO: 92 FL (ref 78–100)
MONOCYTES # BLD AUTO: 0.6 10E9/L (ref 0–1.3)
MONOCYTES NFR BLD AUTO: 6.9 %
NEUTROPHILS # BLD AUTO: 6.5 10E9/L (ref 1.6–8.3)
NEUTROPHILS NFR BLD AUTO: 77.6 %
NRBC # BLD AUTO: 0 10*3/UL
NRBC BLD AUTO-RTO: 0 /100
PLATELET # BLD AUTO: 181 10E9/L (ref 150–450)
RBC # BLD AUTO: 3.98 10E12/L (ref 3.8–5.2)
SPECIMEN EXP DATE BLD: NORMAL
WBC # BLD AUTO: 8.3 10E9/L (ref 4–11)

## 2017-12-08 PROCEDURE — 12000030 ZZH R&B OB INTERMEDIATE UMMC

## 2017-12-08 PROCEDURE — 72200001 ZZH LABOR CARE VAGINAL DELIVERY SINGLE

## 2017-12-08 PROCEDURE — 36415 COLL VENOUS BLD VENIPUNCTURE: CPT | Performed by: FAMILY MEDICINE

## 2017-12-08 PROCEDURE — 85025 COMPLETE CBC W/AUTO DIFF WBC: CPT | Performed by: FAMILY MEDICINE

## 2017-12-08 PROCEDURE — 86900 BLOOD TYPING SEROLOGIC ABO: CPT | Performed by: FAMILY MEDICINE

## 2017-12-08 PROCEDURE — 86780 TREPONEMA PALLIDUM: CPT | Performed by: FAMILY MEDICINE

## 2017-12-08 PROCEDURE — 25000125 ZZHC RX 250: Performed by: FAMILY MEDICINE

## 2017-12-08 PROCEDURE — T1013 SIGN LANG/ORAL INTERPRETER: HCPCS | Mod: U3

## 2017-12-08 PROCEDURE — 99215 OFFICE O/P EST HI 40 MIN: CPT

## 2017-12-08 PROCEDURE — 25000132 ZZH RX MED GY IP 250 OP 250 PS 637: Performed by: FAMILY MEDICINE

## 2017-12-08 PROCEDURE — 25000128 H RX IP 250 OP 636: Performed by: FAMILY MEDICINE

## 2017-12-08 PROCEDURE — 86901 BLOOD TYPING SEROLOGIC RH(D): CPT | Performed by: FAMILY MEDICINE

## 2017-12-08 RX ORDER — MISOPROSTOL 200 UG/1
TABLET ORAL
Status: DISCONTINUED
Start: 2017-12-08 | End: 2017-12-08 | Stop reason: WASHOUT

## 2017-12-08 RX ORDER — ACETAMINOPHEN 325 MG/1
650 TABLET ORAL EVERY 4 HOURS PRN
Status: DISCONTINUED | OUTPATIENT
Start: 2017-12-08 | End: 2017-12-08

## 2017-12-08 RX ORDER — NALBUPHINE HYDROCHLORIDE 10 MG/ML
10-20 INJECTION, SOLUTION INTRAMUSCULAR; INTRAVENOUS; SUBCUTANEOUS
Status: DISCONTINUED | OUTPATIENT
Start: 2017-12-08 | End: 2017-12-08

## 2017-12-08 RX ORDER — ONDANSETRON 2 MG/ML
4 INJECTION INTRAMUSCULAR; INTRAVENOUS EVERY 6 HOURS PRN
Status: DISCONTINUED | OUTPATIENT
Start: 2017-12-08 | End: 2017-12-08

## 2017-12-08 RX ORDER — CARBOPROST TROMETHAMINE 250 UG/ML
250 INJECTION, SOLUTION INTRAMUSCULAR
Status: DISCONTINUED | OUTPATIENT
Start: 2017-12-08 | End: 2017-12-08

## 2017-12-08 RX ORDER — METHYLERGONOVINE MALEATE 0.2 MG/ML
200 INJECTION INTRAVENOUS
Status: DISCONTINUED | OUTPATIENT
Start: 2017-12-08 | End: 2017-12-10 | Stop reason: HOSPADM

## 2017-12-08 RX ORDER — FENTANYL CITRATE 50 UG/ML
50-100 INJECTION, SOLUTION INTRAMUSCULAR; INTRAVENOUS
Status: DISCONTINUED | OUTPATIENT
Start: 2017-12-08 | End: 2017-12-08

## 2017-12-08 RX ORDER — BISACODYL 10 MG
10 SUPPOSITORY, RECTAL RECTAL DAILY PRN
Status: DISCONTINUED | OUTPATIENT
Start: 2017-12-10 | End: 2017-12-10 | Stop reason: HOSPADM

## 2017-12-08 RX ORDER — OXYTOCIN/0.9 % SODIUM CHLORIDE 30/500 ML
100-340 PLASTIC BAG, INJECTION (ML) INTRAVENOUS CONTINUOUS PRN
Status: COMPLETED | OUTPATIENT
Start: 2017-12-08 | End: 2017-12-08

## 2017-12-08 RX ORDER — OXYCODONE AND ACETAMINOPHEN 5; 325 MG/1; MG/1
1 TABLET ORAL
Status: DISCONTINUED | OUTPATIENT
Start: 2017-12-08 | End: 2017-12-08

## 2017-12-08 RX ORDER — NALOXONE HYDROCHLORIDE 0.4 MG/ML
.1-.4 INJECTION, SOLUTION INTRAMUSCULAR; INTRAVENOUS; SUBCUTANEOUS
Status: DISCONTINUED | OUTPATIENT
Start: 2017-12-08 | End: 2017-12-08

## 2017-12-08 RX ORDER — METHYLERGONOVINE MALEATE 0.2 MG/ML
200 INJECTION INTRAVENOUS
Status: DISCONTINUED | OUTPATIENT
Start: 2017-12-08 | End: 2017-12-08

## 2017-12-08 RX ORDER — AMOXICILLIN 250 MG
1 CAPSULE ORAL 2 TIMES DAILY PRN
Status: DISCONTINUED | OUTPATIENT
Start: 2017-12-08 | End: 2017-12-10 | Stop reason: HOSPADM

## 2017-12-08 RX ORDER — IBUPROFEN 800 MG/1
800 TABLET, FILM COATED ORAL EVERY 6 HOURS PRN
Status: DISCONTINUED | OUTPATIENT
Start: 2017-12-08 | End: 2017-12-10 | Stop reason: HOSPADM

## 2017-12-08 RX ORDER — NALOXONE HYDROCHLORIDE 0.4 MG/ML
.1-.4 INJECTION, SOLUTION INTRAMUSCULAR; INTRAVENOUS; SUBCUTANEOUS
Status: DISCONTINUED | OUTPATIENT
Start: 2017-12-08 | End: 2017-12-10 | Stop reason: HOSPADM

## 2017-12-08 RX ORDER — HYDROCORTISONE 2.5 %
CREAM (GRAM) TOPICAL 3 TIMES DAILY PRN
Status: DISCONTINUED | OUTPATIENT
Start: 2017-12-08 | End: 2017-12-10 | Stop reason: HOSPADM

## 2017-12-08 RX ORDER — AMOXICILLIN 250 MG
2 CAPSULE ORAL 2 TIMES DAILY PRN
Status: DISCONTINUED | OUTPATIENT
Start: 2017-12-08 | End: 2017-12-10 | Stop reason: HOSPADM

## 2017-12-08 RX ORDER — OXYTOCIN/0.9 % SODIUM CHLORIDE 30/500 ML
100 PLASTIC BAG, INJECTION (ML) INTRAVENOUS CONTINUOUS
Status: DISCONTINUED | OUTPATIENT
Start: 2017-12-08 | End: 2017-12-10 | Stop reason: HOSPADM

## 2017-12-08 RX ORDER — ACETAMINOPHEN 325 MG/1
650 TABLET ORAL EVERY 4 HOURS PRN
Status: DISCONTINUED | OUTPATIENT
Start: 2017-12-08 | End: 2017-12-10 | Stop reason: HOSPADM

## 2017-12-08 RX ORDER — LANOLIN 100 %
OINTMENT (GRAM) TOPICAL
Status: DISCONTINUED | OUTPATIENT
Start: 2017-12-08 | End: 2017-12-10 | Stop reason: HOSPADM

## 2017-12-08 RX ORDER — OXYTOCIN 10 [USP'U]/ML
10 INJECTION, SOLUTION INTRAMUSCULAR; INTRAVENOUS
Status: DISCONTINUED | OUTPATIENT
Start: 2017-12-08 | End: 2017-12-08

## 2017-12-08 RX ORDER — OXYTOCIN 10 [USP'U]/ML
10 INJECTION, SOLUTION INTRAMUSCULAR; INTRAVENOUS
Status: DISCONTINUED | OUTPATIENT
Start: 2017-12-08 | End: 2017-12-10 | Stop reason: HOSPADM

## 2017-12-08 RX ORDER — MISOPROSTOL 200 UG/1
400 TABLET ORAL
Status: DISCONTINUED | OUTPATIENT
Start: 2017-12-08 | End: 2017-12-10 | Stop reason: HOSPADM

## 2017-12-08 RX ORDER — METOCLOPRAMIDE HYDROCHLORIDE 5 MG/ML
10 INJECTION INTRAMUSCULAR; INTRAVENOUS EVERY 6 HOURS PRN
Status: DISCONTINUED | OUTPATIENT
Start: 2017-12-08 | End: 2017-12-08

## 2017-12-08 RX ORDER — OXYTOCIN/0.9 % SODIUM CHLORIDE 30/500 ML
PLASTIC BAG, INJECTION (ML) INTRAVENOUS
Status: DISCONTINUED
Start: 2017-12-08 | End: 2017-12-08 | Stop reason: HOSPADM

## 2017-12-08 RX ORDER — CARBOPROST TROMETHAMINE 250 UG/ML
250 INJECTION, SOLUTION INTRAMUSCULAR
Status: DISCONTINUED | OUTPATIENT
Start: 2017-12-08 | End: 2017-12-10 | Stop reason: HOSPADM

## 2017-12-08 RX ORDER — SODIUM CHLORIDE, SODIUM LACTATE, POTASSIUM CHLORIDE, CALCIUM CHLORIDE 600; 310; 30; 20 MG/100ML; MG/100ML; MG/100ML; MG/100ML
INJECTION, SOLUTION INTRAVENOUS CONTINUOUS
Status: DISCONTINUED | OUTPATIENT
Start: 2017-12-08 | End: 2017-12-08

## 2017-12-08 RX ORDER — OXYTOCIN/0.9 % SODIUM CHLORIDE 30/500 ML
340 PLASTIC BAG, INJECTION (ML) INTRAVENOUS CONTINUOUS PRN
Status: DISCONTINUED | OUTPATIENT
Start: 2017-12-08 | End: 2017-12-10 | Stop reason: HOSPADM

## 2017-12-08 RX ORDER — LIDOCAINE HYDROCHLORIDE 10 MG/ML
INJECTION, SOLUTION EPIDURAL; INFILTRATION; INTRACAUDAL; PERINEURAL
Status: DISCONTINUED
Start: 2017-12-08 | End: 2017-12-08 | Stop reason: WASHOUT

## 2017-12-08 RX ORDER — IBUPROFEN 800 MG/1
800 TABLET, FILM COATED ORAL
Status: COMPLETED | OUTPATIENT
Start: 2017-12-08 | End: 2017-12-08

## 2017-12-08 RX ORDER — PENICILLIN G POTASSIUM 5000000 [IU]/1
5 INJECTION, POWDER, FOR SOLUTION INTRAMUSCULAR; INTRAVENOUS ONCE
Status: COMPLETED | OUTPATIENT
Start: 2017-12-08 | End: 2017-12-08

## 2017-12-08 RX ORDER — OXYTOCIN 10 [USP'U]/ML
INJECTION, SOLUTION INTRAMUSCULAR; INTRAVENOUS
Status: DISCONTINUED
Start: 2017-12-08 | End: 2017-12-08 | Stop reason: WASHOUT

## 2017-12-08 RX ORDER — PRENATAL VIT/IRON FUM/FOLIC AC 27MG-0.8MG
1 TABLET ORAL DAILY
COMMUNITY

## 2017-12-08 RX ORDER — FERROUS SULFATE 325(65) MG
325 TABLET ORAL
Status: ON HOLD | COMMUNITY
End: 2017-12-09

## 2017-12-08 RX ADMIN — SODIUM CHLORIDE, POTASSIUM CHLORIDE, SODIUM LACTATE AND CALCIUM CHLORIDE: 600; 310; 30; 20 INJECTION, SOLUTION INTRAVENOUS at 13:50

## 2017-12-08 RX ADMIN — SENNOSIDES AND DOCUSATE SODIUM 2 TABLET: 8.6; 5 TABLET ORAL at 20:32

## 2017-12-08 RX ADMIN — ACETAMINOPHEN 650 MG: 325 TABLET, FILM COATED ORAL at 19:01

## 2017-12-08 RX ADMIN — PENICILLIN G POTASSIUM 5 MILLION UNITS: 5000000 POWDER, FOR SOLUTION INTRAMUSCULAR; INTRAPLEURAL; INTRATHECAL; INTRAVENOUS at 13:50

## 2017-12-08 RX ADMIN — ACETAMINOPHEN 650 MG: 325 TABLET, FILM COATED ORAL at 23:47

## 2017-12-08 RX ADMIN — IBUPROFEN 800 MG: 800 TABLET ORAL at 17:35

## 2017-12-08 RX ADMIN — OXYTOCIN-SODIUM CHLORIDE 0.9% IV SOLN 30 UNIT/500ML 340 ML/HR: 30-0.9/5 SOLUTION at 17:01

## 2017-12-08 NOTE — IP AVS SNAPSHOT
MRN:[id number]                      After Visit Summary   12/8/2017    Christine Glasgow    MRN: [id number]           Thank you!     Thank you for choosing [facility] for your care. Our goal is always to provide you with excellent care. Hearing back from our patients is one way we can continue to improve our services. Please take a few minutes to complete the written survey that you may receive in the mail after you visit with us. Thank you!        Patient Information     Date Of Birth          1980        Designated Caregiver       Most Recent Value    Caregiver    Will someone help with your care after discharge? no      About your hospital stay     You were admitted on:  December 8, 2017 You last received care in the:  [facility]    You were discharged on:  December 10, 2017        Reason for your hospital stay       Maternity care                  Who to Call     For medical emergencies, please call 911.  For non-urgent questions about your medical care, please call your primary care provider or clinic, [phone]          Attending Provider     Provider Specialty    Ines Infante DO [facility]       Primary Care Provider Office Phone # Fax #    Sheryl Mary Delgado [phone]      After Care Instructions     Activity       Review discharge instructions            Diet       Resume previous diet            Discharge Instructions - Gestational diabetic patients       Gestational diabetic patients to follow up for fasting blood sugar and 2 hour 75gm glucose load at 6 weeks postpartum.            Discharge Instructions - Hypertensive disorders patients       High Blood pressure patients to follow up in clinic or via home care within one week for a blood pressure check            Discharge Instructions - Postpartum visit       Schedule postpartum visit with your provider and return to clinic in 6 weeks.                  Follow-up Appointments     Follow Up and  recommended labs and tests       Follow up with primary care provider, Sheryl Delgado, in 6 weeks.                  Further instructions from your care team       Vaginal Delivery Discharge Instructions: Armenian  Actividad:     Pida a los miembros de wilson idalia y amigos que la ayuden cuando lo necesite.    No ponga nada en wilson vagina hasta que wilson médico lo permita.    Tómese las próximas semanas con calma para que wilson cuerpo tenga tiempo de recuperarse. En juan pablo momento puede hacer cualquier actividad que sienta que puede.    No conduzca si está tomando píldoras para el dolor recetadas por wilson médico. Puede conducir si está tomando píldoras de venta ehsan para el dolor.    Llame a wilson proveedor de atención médica si tiene alguno de estos síntomas:    Empapa deonte toalla femenina con sukhwinder en el correr de 1 hora o ve coágulos más grandes que deonte pelota de golf.    Sangrado que dura más de 6 semanas.    Tiene deonte secreción vaginal que huele mal.     Fiebre de 100.4  F (38  C) o más (temperatura tomada bajo wilson lengua) con o sin escalofríos     Dolor, calambres o sensibilidad graves en la región inferior de wilson vientre.    Aumento del dolor, hinchazón, enrojecimiento o líquido alrededor de suzie puntos.    Necesidad más frecuente o urgente de orinar (hacer pis), o ardor al hacerlo.    Enrojecimiento, hinchazón o dolor alrededor de deonte vena en wilson pierna.    Problemas para amamantar o un área enrojecida o dolorosa en wilson pecho.    Dolor que aumenta o no se va de deonte episiotomía o desgarro en el perineo.    Náuseas y vómitos    Dolor en el pecho y tos o dificultad para respirar.    Problemas para manejar la tristeza, ansiedad o depresión.     Si le preocupa hacerse daño o hacerle daño al bebé, llame al médico de inmediato.     Tiene preguntas o inquietudes después de regresar a casa.    Mantenga suzie gregory limpias:  Lávese siempre las gregory antes de tocar el área de wilson perineo y los puntos.  Roberta ayuda a reducir wilson riesgo de  infección.  Si suzie gregory no están sucias, puede usar un gel de alcohol para limpiarse las gregory. Mantenga suzie uñas cortas y limpias.      Vaginal Delivery Discharge Instructions  Activity:     Ask family and friends for help when you need it.    Do not place anything in your vagina until your doctor approves.    Take it easy for the next few weeks to allow your body to recover. You may do any activities you feel up to at that point.    Do not drive while taking pain pills prescribed by your doctor. You may drive if taking over-the-counter pain pills.    Call your health care provider if you have any of these symptoms:    You soak a sanitary pad with blood within 1 hour, or you see blood clots larger than a golf ball.    Bleeding that lasts more than 6 weeks.    You have vaginal discharge that smells bad.     A fever of 100.4  F (38  C) or higher (temperature taken under your tongue), with or without chills     Severe, pain, cramping or tenderness in your lower belly area.    Increased pain, swelling, redness or fluid around your stitches.    A more frequent or urgent need to urinate (pee), or it burns when you pee.    Redness, swelling or pain around a vein in your leg.    Problems breastfeeding, or a red or painful area on your breast.    Pain that increases or does not go away from an episiotomy or perineal tear.    Nausea and vomiting.    Chest pain and cough or are gasping for air.    Problems coping with sadness, anxiety, or depression.     If you have any concerns about hurting yourself or the baby, call your doctor right away.     You have questions or concerns after you return home.    Keep your hands clean:  Always wash your hands before touching your perineal area and stitches.  This helps reduce your risk of infection.  If your hands aren t dirty, you may use an alcohol hand-rub to clean your hands. Keep your nails clean and short.        Pending Results     No orders found from 12/6/2017 to 12/9/2017.     "        Statement of Approval     Ordered          12/10/17 1116  I have reviewed and agree with all the recommendations and orders detailed in this document.  EFFECTIVE NOW     Approved and electronically signed by:  Joycelyn Stahl MD             Admission Information     Date & Time Provider Department Dept. Phone    2017 Ines Infante DO [facility] [phone]      Your Vitals Were     Blood Pressure Pulse Temperature Respirations Last Period       103/56 74 98.4  F (36.9  C) (Oral) 16 2017       MyChart Information     [facility] lets you send messages to your doctor, view your test results, renew your prescriptions, schedule appointments and more. To sign up, go to [email or web address] . Click on \"Log in\" on the left side of the screen, which will take you to the Welcome page. Then click on \"Sign up Now\" on the right side of the page.     You will be asked to enter the access code listed below, as well as some personal information. Please follow the directions to create your username and password.     Your access code is: [email or web address]  Expires: 2018 12:09 AM     Your access code will  in 90 days. If you need help or a new code, please call your [facility] clinic or [phone].        Care EveryWhere ID     This is your Care EveryWhere ID. This could be used by other organizations to access your [facility] medical records  [id number]        Equal Access to Services     [facility] Hospital[facility]IGUEL AH: Hadii nydia schmid hadasho Somartinali, waaxda luqadaha, qaybta kaalmada adeegyada, kathy blount . So [facility] [phone].    ATENCIÓN: Si habla español, tiene a wilson disposición servicios gratuitos de asistencia lingüística. Llame al [phone].    We comply with applicable federal civil rights laws and [place] laws. We do not discriminate on the basis of race, color, national origin, age, disability, sex, sexual orientation, or gender identity.               Review of your medicines    "   START taking        Dose / Directions    acetaminophen 325 MG tablet   Commonly known as:  TYLENOL   Used for:   (spontaneous vaginal delivery)        Dose:  650 mg   Take 2 tablets (650 mg) by mouth every 4 hours as needed for mild pain   Quantity:  100 tablet   Refills:  0       ibuprofen 800 MG tablet   Commonly known as:  ADVIL/MOTRIN        Dose:  800 mg   Take 1 tablet (800 mg) by mouth every 6 hours as needed for other (cramping)   Quantity:  90 tablet   Refills:  0       senna-docusate 8.6-50 MG per tablet   Commonly known as:  SENOKOT-S;PERICOLACE        Dose:  1 tablet   Take 1 tablet by mouth 2 times daily as needed for constipation   Quantity:  100 tablet   Refills:  0         CONTINUE these medicines which have NOT CHANGED        Dose / Directions    ferrous sulfate 325 (65 FE) MG tablet   Commonly known as:  IRON        Dose:  325 mg   Take 1 tablet (325 mg) by mouth daily (with breakfast)   Quantity:  100 tablet   Refills:  0       prenatal multivitamin plus iron 27-0.8 MG Tabs per tablet        Dose:  1 tablet   Take 1 tablet by mouth daily   Refills:  0       VITAMIN D (CHOLECALCIFEROL) PO        Dose:  2000 Units   Take 2,000 Units by mouth daily   Refills:  0            Where to get your medicines      These medications were sent to [facility] [place], [place] - [address]  [address] [address]     Phone:  [phone]     acetaminophen 325 MG tablet    ferrous sulfate 325 (65 FE) MG tablet    ibuprofen 800 MG tablet    senna-docusate 8.6-50 MG per tablet                Protect others around you: Learn how to safely use, store and throw away your medicines at www.disposemymeds.org.             Medication List: This is a list of all your medications and when to take them. Check marks below indicate your daily home schedule. Keep this list as a reference.      Medications           Morning Afternoon Evening Bedtime As Needed    acetaminophen 325 MG  tablet   Commonly known as:  TYLENOL   Take 2 tablets (650 mg) by mouth every 4 hours as needed for mild pain   Last time this was given:  650 mg on 12/9/2017  3:39 AM                                ferrous sulfate 325 (65 FE) MG tablet   Commonly known as:  IRON   Take 1 tablet (325 mg) by mouth daily (with breakfast)                                ibuprofen 800 MG tablet   Commonly known as:  ADVIL/MOTRIN   Take 1 tablet (800 mg) by mouth every 6 hours as needed for other (cramping)   Last time this was given:  800 mg on 12/10/2017  5:00 AM                                prenatal multivitamin plus iron 27-0.8 MG Tabs per tablet   Take 1 tablet by mouth daily                                senna-docusate 8.6-50 MG per tablet   Commonly known as:  SENOKOT-S;PERICOLACE   Take 1 tablet by mouth 2 times daily as needed for constipation   Last time this was given:  2 tablets on 12/9/2017  7:44 PM                                VITAMIN D (CHOLECALCIFEROL) PO   Take 2,000 Units by mouth daily

## 2017-12-08 NOTE — PLAN OF CARE
Data: Patient presented to [facility] at 1250.   Reason for maternal/fetal assessment per patient is Rule out rupture of membranes  .  Patient is a . Prenatal record reviewed.      Obstetric History       T3      L3     SAB0   TAB0   Ectopic0   Multiple0   Live Births0       # Outcome Date GA Lbr Leonel/2nd Weight Sex Delivery Anes PTL Lv   4 Current            3 Term            2 Term            1 Term               . Medical history: History reviewed. No pertinent past medical history.. Gestational Age 39w1d. VSS. Fetal movement present. Patient denies  backache, vaginal discharge, pelvic pressure, UTI symptoms, GI problems, vaginal bleeding, edema, headache, visual disturbances, epigastric or URQ pain, abdominal pain. Support persons are not present.  on his way  Action: Verbal consent for EFM. Triage assessment completed. EFM applied upon admission. Uterine assessment reveals contractions every 5-7 minutes. Fetal assessment: Presumed adequate fetal oxygenation documented (see flow record).   Response: Dr. Infante informed of patient arrival. Plan per provider is admission to labor and delivery, grossly ruptured and leaking. Patient verbalized agreement with plan. Patient transferred to room 479 ambulatory, oriented to room and call light.

## 2017-12-08 NOTE — L&D DELIVERY NOTE
Delivery Summary - No Diana  Delivery Summary                                               Delivery Summary    Christine Glasgow MRN# [id number]   Age: 37 year old YOB: 1980   Primary care provider: Sheryl Delgado  Home Clinic:  [facility]      On 17 at 1652, this 37 year old year old  under Nitrous Oxide analgesia delivered a viable Male infant (who is still enjoying skin-to-skin and has not been weighed) with APGAR scores of 9 at 1 min and 9 at 5 min.      Labor was spontaneous.   Complications: None     Stage 1: 5.00 hr, 50.00 min   Patient's GBS status was positive. Patient received Penicillin 3 hours prior to delivery. Membrane status: SROM Rupture time: 1200. Analgesia provided: Nitrous Oxide.    Stage 2:   hr,  min  Delivery was managed suddenly after 2 pushes via .  Infant was bulb suctioned at delivery. Delayed cord clamping was performed, and cord was clamped and cut and the infant was handed to waiting mother. Cord blood was sent and held for ABO analysis.     Stage 3:  0.00 hr, 7.00 min  Placental delivery was spontaneous, intact,  with a 3 vessel cord. IV oxytocin was given.      The perineum was inspected and the perineum was intact.          Sponge Count Correct  Yes     Needle Count Correct  Yes   Adequate hemostasis acheived.    QBL:  321 mL.   Mother and infant in stable condition.     Present at Delivery:  Dr. CRISTINE Infante DO Faculty  Dr. MARGARET Davison DO PGY-1  Nurse: Etelvina Pérez RN      Christine Glasgow MRN# [id number]   Age: 37 year old YOB: 1980     Labor Event Times:    Labor Onset Date       Labor Onset Time    Dilation Complete Date    Dilation Complete Time       Start Pushing Date        Start Pushing Time            Labor Length:    1st Stage (hrs/min) 5.00 50.00   2nd Stage (hrs/min)     3rd Stage (hrs/min) 0.00 7.00       Labor Events:     Labor No   Rupture Date     Rupture Time     Rupture Type Spontaneous  rupture of membranes occuring during spontaneous labor or augmentation   Fluid Color     Labor Type     Induction    Induction Indication         Augmentation    Labor Complications     Additional Complications     Management of Labor        Antibiotics     IV Antibiotic Given     Additional Management     Fetal Status Prior to  Delivery     Fetal Status Comments         Cervical Ripening:    Date     Time     Type         Delivery:    Episiotomy None   Local Anesthetic        Lacerations None   Sponge Count Correct       Needle Count Correct     Final Count by:    Sutures     Blood loss (ml)    Packing Intentionally Left In     Number     Comments           Information for the patient's :  Eunice Glasgow [[id number]]       Delivery  2017 4:52 PM by  Vaginal, Spontaneous Delivery  Sex:  male Gestational Age: 39w1d  Delivery Clinician:     Living?:            APGARS  One minute Five minutes Ten minutes   Skin color:            Heart rate:            Grimace:            Muscle tone:            Breathing:            Totals: 9  9         Presentation/position:           Resuscitation and Interventions: Method:  None  Oxygen Type:     Intubation Time:   # of Attempts:     ETT Size:        Tracheal Suction:     Tracheal returns:       Care at Delivery:  Baby had spontaneous cry and was placed on mother's abdomen for skin to skin. Dried and stimulated        Cord information:     Disposition of cord blood:      Blood gases sent?    Complications:     Placenta: Delivered:           appearance.  Comments:  .  Disposition: Hospital disposal   Measurements:  Weight:    Height:    Head circumference:    Chest circumference:     Temperature:     Other providers:       Additional  information:  Forceps:    Verbal Informed Consent Obtained:       Alternative Labor Strategies Discussed:     Emergency Resources Available:       Type:       Accrued Pulling Time:       # of Pulls:      Position:      Fetal Station:       Indications:      Other Indications:     Operative Vaginal Delivery Brief Note Forceps:        Vacuum:    Verbal Informed Consent Obtained:     Alternative Labor Strategies Discussed:     Emergency Resources Available:     Type:      Accrued Pulling Time:       # of Pop-Offs:       # of Pulls:       Position:     Fetal Station:      Indications for Vacuum:       Other Indications:    Operative Vaginal Delivery Brief Note Vacuum:        Shoulder Dystocia Shoulder Dystocia    Fetal Tracing Comments:  intermittent throughout, no decels noted.   Shoulder dystocia present?:  No                                            Breech:       : Type:     Indications for Primary:     Indications for Secondary:     Other Indications:        Observed anomalies     Output in Delivery Room:

## 2017-12-08 NOTE — IP AVS SNAPSHOT
[facility]    [address] [address]    Phone:  [phone]                                       After Visit Summary   12/8/2017    Christine Glasgow    MRN: [id number]           After Visit Summary Signature Page     I have received my discharge instructions, and my questions have been answered. I have discussed any challenges I see with this plan with the nurse or doctor.    ..........................................................................................................................................  Patient/Patient Representative Signature      ..........................................................................................................................................  Patient Representative Print Name and Relationship to Patient    ..................................................               ................................................  Date                                            Time    ..........................................................................................................................................  Reviewed by Signature/Title    ...................................................              ..............................................  Date                                                            Time

## 2017-12-08 NOTE — H&P
[facility]  Ob Admit /Triage Note    Christine Glasgow MRN# [id number]   Age: 37 year old YOB: 1980     Date of Admission: 2017 2:45 PM  Date of service: 2017.       History of Present Illness (Resident / Clinician):   Christine Glasgow is a patient of Sheryl Barrera from [facility].   She is a 37 year old  who is 39w1d pregnant with TRISTAN  Dec 14, 2017, by 8w0d US.    [facility] at 1200.    She reports regular contractions starting at 1100, and occurring every 3-5 minutes. She reports fluid leakage. She denies bleeding per vagina. Fetal movement is .normal.    Her prenatal course has been uncomplicated. She is GBS +.    Prenatal labs   Lab Results   Component Value Date    ABO O 2017    RH Pos 2017    CHPCRT Negative 11/10/2017    GCPCRT Negative 11/10/2017    HGB 12.4 2017       GBS was collected on 17.         Obstetrical History:   She is a 37 year old   Her OB history:   Obstetric History       T3      L3     SAB0   TAB0   Ectopic0   Multiple0   Live Births0       # Outcome Date GA Lbr Leonel/2nd Weight Sex Delivery Anes PTL Lv   4 Current            3 Term            2 Term            1 Term                  All of her prior children have been delivered vaginally. Her largest baby was nearly 8 lbs. Her most recent child was 11 years ago.           Immunzations:   She has had Hep B immunizations during this pregnancy.   She is not immune to Varicella.  Tdap this pregnancy?:YES - Date: 10/13/17  Flu shot this pregnancy? YES - 10/27/17         Past Medical History:   History reviewed. No pertinent past medical history.         Past Surgical History:   History reviewed. No pertinent surgical history.         Family History:   No family history on file.         Social History:   no tobacco use  no alcohol use  no illicit drug use          Medications:     No current facility-administered medications on file prior to encounter.   No current outpatient prescriptions on file prior to encounter.         Allergies:   Ciprofloxacin         Review of Systems:   CONSTITUTIONAL: no fatigue, no unexpected change in weight  SKIN: no worrisome rashes or lesions  EYES: no acute vision problems or changes  RESP: no significant cough, no shortness of breath  CV: no chest pain, no palpitations, no new or worsening peripheral edema  GI: no nausea, no vomiting, no constipation, no diarrhea  : no frequency, no dysuria, no hematuria  NEURO: no weakness, no dizziness, no headaches  PSYCHIATRIC: NEGATIVE for changes in mood or trouble with sleep         Physical Exam:   Vitals:   /78  Pulse 67  Temp 97.6  F (36.4  C) (Oral)  Resp 18  LMP 2017  0 lbs 0 oz  There is no height or weight on file to calculate BMI.    GEN: Awake, alert in no apparent distress   HEENT: grossly normal  NECK: no lymphadenopathy or thryoidomegaly  RESPIRATORY: clear to auscultation bilaterally, no increased work of breathing  BACK:  no costovertebral angle tenderness   CARDIOVASCULAR: RRR, no murmur  ABDOMEN: gravid, 40 cm.  Vertex by Leopold's  PELVIC:  no fluid noted, no blood noted.  Presenting part: head.  Cervix: 8/80/0  EXT:  no edema or calf tenderness  EFW on Exam: 7 lbs  Confirmed VTX by Ultrasound? Yes    NST interpretation:  Ordered by  Dr. CRISTINE Infante  Start time 1300   Stop time 1351  External Monitor, TOCO: Contractions every 1-3 minutes and palpate strong, Tracing is Category 1. No decelerations.  Interpretation: reactive      Assessment and Plan:   Assessment:   Christine Glasgow is a 37 year old  at 39w1d in active labor.   Patient Active Problem List   Diagnosis     Encounter for triage in pregnant patient     GBS bacteriuria     Normal labor         Plan:   - Prophylactic antibiotic for + GBS status administered, but she might deliver before 4 hours.  -  Pain: She is changing fast and contractions appear to be painful. She is managing with NO, and might switch to IV Fentanyl if desired.  - Anticipate   - Continue to intermittently auscultate FHT.       DO [facility]  [phone]  [facility]

## 2017-12-09 LAB
HGB BLD-MCNC: 10.9 G/DL (ref 11.7–15.7)
T PALLIDUM IGG+IGM SER QL: NEGATIVE

## 2017-12-09 PROCEDURE — 85018 HEMOGLOBIN: CPT | Performed by: FAMILY MEDICINE

## 2017-12-09 PROCEDURE — 12000030 ZZH R&B OB INTERMEDIATE UMMC

## 2017-12-09 PROCEDURE — 25000132 ZZH RX MED GY IP 250 OP 250 PS 637: Performed by: FAMILY MEDICINE

## 2017-12-09 PROCEDURE — 36415 COLL VENOUS BLD VENIPUNCTURE: CPT | Performed by: FAMILY MEDICINE

## 2017-12-09 RX ORDER — AMOXICILLIN 250 MG
1 CAPSULE ORAL 2 TIMES DAILY PRN
Qty: 100 TABLET | Refills: 0 | Status: SHIPPED | OUTPATIENT
Start: 2017-12-09

## 2017-12-09 RX ORDER — FERROUS SULFATE 325(65) MG
325 TABLET ORAL
Qty: 100 TABLET | Refills: 0 | Status: SHIPPED | OUTPATIENT
Start: 2017-12-09

## 2017-12-09 RX ORDER — IBUPROFEN 800 MG/1
800 TABLET, FILM COATED ORAL EVERY 6 HOURS PRN
Qty: 90 TABLET | Refills: 0 | Status: SHIPPED | OUTPATIENT
Start: 2017-12-09

## 2017-12-09 RX ORDER — PROCHLORPERAZINE 25 MG
25 SUPPOSITORY, RECTAL RECTAL EVERY 12 HOURS PRN
Status: DISCONTINUED | OUTPATIENT
Start: 2017-12-09 | End: 2017-12-10 | Stop reason: HOSPADM

## 2017-12-09 RX ADMIN — ACETAMINOPHEN 650 MG: 325 TABLET, FILM COATED ORAL at 03:39

## 2017-12-09 RX ADMIN — IBUPROFEN 800 MG: 800 TABLET ORAL at 00:34

## 2017-12-09 RX ADMIN — IBUPROFEN 800 MG: 800 TABLET ORAL at 06:47

## 2017-12-09 RX ADMIN — IBUPROFEN 800 MG: 800 TABLET ORAL at 21:21

## 2017-12-09 RX ADMIN — IBUPROFEN 800 MG: 800 TABLET ORAL at 15:13

## 2017-12-09 RX ADMIN — SENNOSIDES AND DOCUSATE SODIUM 2 TABLET: 8.6; 5 TABLET ORAL at 19:44

## 2017-12-09 NOTE — PLAN OF CARE
Problem: Patient Care Overview  Goal: Plan of Care/Patient Progress Review  Outcome: Improving  Data: Christine Glasgow transferred to 7121 via wheelchair at 1930. Baby transferred via parent's arms.  Action: Receiving unit notified of transfer: Yes. Patient and family notified of room change. Report given to BRAYAN Davila at 1935. Belongings sent to receiving unit. Accompanied by Registered Nurse. Oriented patient to surroundings. Call light within reach. ID bands double-checked with receiving RN.  Response: Patient tolerated transfer and is stable.

## 2017-12-09 NOTE — PLAN OF CARE
Problem: Postpartum (Vaginal Delivery) (Adult,Obstetrics,Pediatric)  Goal: Signs and Symptoms of Listed Potential Problems Will be Absent, Minimized or Managed (Postpartum)  Signs and symptoms of listed potential problems will be absent, minimized or managed by discharge/transition of care (reference Postpartum (Vaginal Delivery) (Adult,Obstetrics,Pediatric) CPG).   Outcome: Improving  VSS. Complained of some pain but adequately controlled with meds and hot packs. Requires full assistance with breastfeeding, positioning, and latching. Continue to educate and assist with breastfeeds. Lanolin cream given for tender nipples. FOB present and assisting with cares.

## 2017-12-09 NOTE — PLAN OF CARE
Problem: Patient Care Overview  Goal: Plan of Care/Patient Progress Review  Outcome: Improving  Doing well.  Pain is well-controlled.  No fevers.  No history of foul-smelling vaginal discharge.  Good appetite.  Denies chest pain, shortness of breath, nausea or vomiting.  Vaginal bleeding is similar to a heavy menstrual flow.  Ambulatory.  Breastfeeding well. GBS+ inadequate treatment. Patient notified that mom and baby need to stay for 48 hours for observation due to GBS status. Questions and concerns answered.

## 2017-12-09 NOTE — PLAN OF CARE
Problem: Patient Care Overview  Goal: Plan of Care/Patient Progress Review  Outcome: Improving  Patient has mild cramping and medicated with motrin which she claimed was helpful. She is bonding well with baby and  was assisted with deeper latch. Will continue with plan of care.

## 2017-12-09 NOTE — PLAN OF CARE
Problem: Patient Care Overview  Goal: Plan of Care/Patient Progress Review  Outcome: Improving  Vaginal Delivery Note   of viable Male with Sr. Arelis in attendance. Nursery RN Miryam present.  Infant with spontaneous cry, to mother's abdomen, dried and stimulated.  APGAR at 1 minute:  9 and APGAR at 5 minutes:  9.  Placenta delivered with out complication, none, no laceration, no repair, kami cares provided.  Mother and baby in stable condition.

## 2017-12-09 NOTE — PROGRESS NOTES
Discussed with patient the presumed lacking of prenatal care and thus needing to bag the , obtain clean catch urine from her, and obtain blood glucose of baby. With ipad  use, patient stated that she remembers testing negative for GDM, she also explained that she went to all of her prenatal visits every months and every 2 weeks when required. Will follow up with provider to clarify.

## 2017-12-09 NOTE — PLAN OF CARE
Problem: Patient Care Overview  Goal: Plan of Care/Patient Progress Review  Outcome: Improving  Stable postpartum. Ambulating in room and urinating without difficulty. Breast feeding  on demand and assisted pt with latch and positioning.  Family bonding with .

## 2017-12-09 NOTE — PROGRESS NOTES
[facility] Postpartum Progress Note  2017 9:00 AM  Date of service: 2017.         Interval History:   Christine Glasgow  POD#1     Pain controlled: With tylenol and Ibuprofen as needed   Ambulating: without difficulties   Regular diet: As tolerated   Voiding: without difficulties   Lochia: Reports moderated vaginal bleeding. More than normal period   Breastfeeding: Yes. Some difficulty with latch, working with nursing.   Contraception: Considering Nexplanon implant.     Visit performed with assistance of in-person [place] interpretor         Physical Exam:     Vitals:    17 1857 17 1912 17 0008 17 0300   BP: 119/64 115/56 109/55 98/52   Pulse:   69    Resp:   20 16   Temp:   98.7  F (37.1  C) 98.4  F (36.9  C)   TempSrc:   Oral Oral       GENERAL:         healthy, alert and no distress  RESPIRATORY:   No increased work of breathing, good air exchange, clear to auscultation bilaterally, no crackles or wheezing   CARDIOVASCULAR:    regular rate and rhythm, normal S1 and S2, no S3 or S4, and no murmur noted   ABDOMEN:   No scars, normal bowel sounds, soft, non-distended, non-tender, no masses palpated, no hepatosplenomegally  Fundal height at level of umbilicus.  Firm and non tender.   EXTREMITIES:           edema, non-tender          Data:     Hemoglobin   Date Value Ref Range Status   2017 10.9 (L) 11.7 - 15.7 g/dL Final   2017 12.4 11.7 - 15.7 g/dL Final     Lab Results   Component Value Date    RH Pos 2017            Assessment and Plan:    Christine Glasgow is a 37 year old  PPD#1 s/p ..  L&D complications: None     Patient Active Problem List   Diagnosis     Encounter for triage in pregnant patient     GBS bacteriuria     Normal labor     Labor and delivery, indication for care       Pain: Tylenol and Ibuprofen as needed   Anemia?Yes:   Diet: Normal    Encourage ambulation  Baby feeding by breast  Contraception: considering Nexplanon, not certain  Influenza vaccine: Yes   Tdap (for pertussis): UTD  Counseled about: Contraception and breast feeding  Dispo: Routine post-partum cares, anticipate DC home PPD# 2.    Yang Hand MD    Attestation:  This patient has been seen and evaluated by me, Sofía Saxena on 12/9/2017.  I saw and discussed the case with the primary resident and the care team. I agree with the findings and plan in this note. I have reviewed today's vital signs, medications, laboratory results.    Sofía Saxena MD  [facility]

## 2017-12-09 NOTE — PLAN OF CARE
Patient arrived to [facility] unit via wheelchair at 1930,with belongings, accompanied by family, with infant in arms. Received report from Etelvina Pérez RN and checked bands. Unit and room orientation completd. Call light given; no concerns present at this time. Continue with plan of care.

## 2017-12-10 ENCOUNTER — OFFICE VISIT (OUTPATIENT)
Dept: INTERPRETER SERVICES | Facility: CLINIC | Age: 37
End: 2017-12-10

## 2017-12-10 VITALS
RESPIRATION RATE: 16 BRPM | HEART RATE: 74 BPM | DIASTOLIC BLOOD PRESSURE: 56 MMHG | SYSTOLIC BLOOD PRESSURE: 103 MMHG | TEMPERATURE: 98.4 F

## 2017-12-10 PROCEDURE — 25000132 ZZH RX MED GY IP 250 OP 250 PS 637: Performed by: FAMILY MEDICINE

## 2017-12-10 PROCEDURE — T1013 SIGN LANG/ORAL INTERPRETER: HCPCS | Mod: U3

## 2017-12-10 RX ORDER — ACETAMINOPHEN 325 MG/1
650 TABLET ORAL EVERY 4 HOURS PRN
Qty: 100 TABLET | Refills: 0 | Status: SHIPPED | OUTPATIENT
Start: 2017-12-10

## 2017-12-10 RX ADMIN — IBUPROFEN 800 MG: 800 TABLET ORAL at 05:00

## 2017-12-10 NOTE — PLAN OF CARE
Problem: Patient Care Overview  Goal: Plan of Care/Patient Progress Review  Outcome: No Change  Stable postpartum. Uterine cramping pain managed with medication. Independent with self and baby cares. Family here and supportive.

## 2017-12-10 NOTE — PLAN OF CARE
Problem: Postpartum (Vaginal Delivery) (Adult,Obstetrics,Pediatric)  Goal: Signs and Symptoms of Listed Potential Problems Will be Absent, Minimized or Managed (Postpartum)  Signs and symptoms of listed potential problems will be absent, minimized or managed by discharge/transition of care (reference Postpartum (Vaginal Delivery) (Adult,Obstetrics,Pediatric) CPG).   Outcome: Improving  Data: Vital signs within normal limits. Postpartum checks within normal limits - see flow record. Patient eating and drinking normally. Patient able to empty bladder independently and is up ambulating. Patient performing self cares and is able to care for infant.  Action: Patient medicated during the shift for uterine cramping. Patient education done about the importance of continuing to bring  to breast to stimulate milk supply as she is worried about her supply and requested formula.   Response: Positive attachment behaviors observed with infant. Family at bedside and very attentive.   Will continue to monitor and provide support.

## 2017-12-10 NOTE — PLAN OF CARE
Problem: Patient Care Overview  Goal: Plan of Care/Patient Progress Review  Outcome: Adequate for Discharge Date Met: 12/10/17  AVS reviewed by [place] speaker, and patient denies further questions/concerns.  Has pump for discharge, with plans to call insurance company tomorrow to verify coverage. Will follow up with provider as directed. Has all belongings.  Discharged this evening with infant, accompanied by father of baby and sibling.

## 2017-12-10 NOTE — DISCHARGE INSTRUCTIONS
Vaginal Delivery Discharge Instructions: Japanese  Actividad:     Pida a los miembros de wilson idalia y amigos que la ayuden cuando lo necesite.    No ponga nada en wilson vagina hasta que wilson médico lo permita.    Tómese las próximas semanas con calma para que wilson cuerpo tenga tiempo de recuperarse. En juan pablo momento puede hacer cualquier actividad que sienta que puede.    No conduzca si está tomando píldoras para el dolor recetadas por wilson médico. Puede conducir si está tomando píldoras de venta ehsan para el dolor.    Llame a wilson proveedor de atención médica si tiene alguno de estos síntomas:    Empapa deonte toalla femenina con sukhwinder en el correr de 1 hora o ve coágulos más grandes que deonte pelota de golf.    Sangrado que dura más de 6 semanas.    Tiene deonte secreción vaginal que huele mal.     Fiebre de 100.4  F (38  C) o más (temperatura tomada bajo wilson lengua) con o sin escalofríos     Dolor, calambres o sensibilidad graves en la región inferior de wilson vientre.    Aumento del dolor, hinchazón, enrojecimiento o líquido alrededor de suzie puntos.    Necesidad más frecuente o urgente de orinar (hacer pis), o ardor al hacerlo.    Enrojecimiento, hinchazón o dolor alrededor de deonte vena en wilson pierna.    Problemas para amamantar o un área enrojecida o dolorosa en wilson pecho.    Dolor que aumenta o no se va de deonte episiotomía o desgarro en el perineo.    Náuseas y vómitos    Dolor en el pecho y tos o dificultad para respirar.    Problemas para manejar la tristeza, ansiedad o depresión.     Si le preocupa hacerse daño o hacerle daño al bebé, llame al médico de inmediato.     Tiene preguntas o inquietudes después de regresar a casa.    Mantenga suzie gregory limpias:  Lávese siempre las gregory antes de tocar el área de wilson perineo y los puntos.  [place] ayuda a reducir wilson riesgo de infección.  Si suzie gregory no están sucias, puede usar un gel de alcohol para limpiarse las gregory. Mantenga suzie uñas cortas y limpias.      Vaginal Delivery Discharge  Instructions  Activity:     Ask family and friends for help when you need it.    Do not place anything in your vagina until your doctor approves.    Take it easy for the next few weeks to allow your body to recover. You may do any activities you feel up to at that point.    Do not drive while taking pain pills prescribed by your doctor. You may drive if taking over-the-counter pain pills.    Call your health care provider if you have any of these symptoms:    You soak a sanitary pad with blood within 1 hour, or you see blood clots larger than a golf ball.    Bleeding that lasts more than 6 weeks.    You have vaginal discharge that smells bad.     A fever of 100.4  F (38  C) or higher (temperature taken under your tongue), with or without chills     Severe, pain, cramping or tenderness in your lower belly area.    Increased pain, swelling, redness or fluid around your stitches.    A more frequent or urgent need to urinate (pee), or it burns when you pee.    Redness, swelling or pain around a vein in your leg.    Problems breastfeeding, or a red or painful area on your breast.    Pain that increases or does not go away from an episiotomy or perineal tear.    Nausea and vomiting.    Chest pain and cough or are gasping for air.    Problems coping with sadness, anxiety, or depression.     If you have any concerns about hurting yourself or the baby, call your doctor right away.     You have questions or concerns after you return home.    Keep your hands clean:  Always wash your hands before touching your perineal area and stitches.  This helps reduce your risk of infection.  If your hands aren t dirty, you may use an alcohol hand-rub to clean your hands. Keep your nails clean and short.

## 2017-12-10 NOTE — DISCHARGE SUMMARY
[facility]  Post-partum Discharge Summary    Christine Glasgow MRN# [id number]   Age: 37 year old YOB: 1980     Date of Admission:  2017  Date of Discharge::  12/10/2017  Admitting Physician:  Ines Infante DO  Discharge Physician:  Joycelyn Lloyd MD     Home clinic: [facility]            Admission Diagnoses:   maternity*elizabeth:  17/labor  Normal labor  Labor and delivery, indication for care          Discharge Diagnosis:   Normal spontaneous vaginal delivery  Patient Active Problem List   Diagnosis     Encounter for triage in pregnant patient     GBS bacteriuria     Normal labor     Labor and delivery, indication for care             Procedures:   Procedure(s): No additional procedures performed       No procedures performed during this admission           Medications Prior to Admission:     Prescriptions Prior to Admission   Medication Sig Dispense Refill Last Dose     Prenatal Vit-Fe Fumarate-FA (PRENATAL MULTIVITAMIN PLUS IRON) 27-0.8 MG TABS per tablet Take 1 tablet by mouth daily   2017 at Unknown time     VITAMIN D, CHOLECALCIFEROL, PO Take 2,000 Units by mouth daily   2017 at Unknown time             Discharge Medications:     Current Discharge Medication List      START taking these medications    Details   acetaminophen (TYLENOL) 325 MG tablet Take 2 tablets (650 mg) by mouth every 4 hours as needed for mild pain  Qty: 100 tablet, Refills: 0    Associated Diagnoses:  (spontaneous vaginal delivery)      ibuprofen (ADVIL/MOTRIN) 800 MG tablet Take 1 tablet (800 mg) by mouth every 6 hours as needed for other (cramping)  Qty: 90 tablet, Refills: 0    Associated Diagnoses: Labor and delivery, indication for care      senna-docusate (SENOKOT-S;PERICOLACE) 8.6-50 MG per tablet Take 1 tablet by mouth 2 times daily as needed for constipation  Qty: 100 tablet, Refills: 0    Associated Diagnoses: Labor and  delivery, indication for care         CONTINUE these medications which have CHANGED    Details   ferrous sulfate (IRON) 325 (65 FE) MG tablet Take 1 tablet (325 mg) by mouth daily (with breakfast)  Qty: 100 tablet, Refills: 0    Associated Diagnoses: Labor and delivery, indication for care         CONTINUE these medications which have NOT CHANGED    Details   Prenatal Vit-Fe Fumarate-FA (PRENATAL MULTIVITAMIN PLUS IRON) 27-0.8 MG TABS per tablet Take 1 tablet by mouth daily      VITAMIN D, CHOLECALCIFEROL, PO Take 2,000 Units by mouth daily                   Consultations:   No consultations were requested during this admission          Brief History of Labor:     On 17 at 4:52PM, this 37 year old year old  under Nitrous oxide analgesia delivered a viable Male infant weighing 7lbs 0.5oz with APGAR scores below:  APGARs 1 Min 5Min 10Min   Totals:  9  9              Hospital Course (HPI):   The patient's hospital course was unremarkable.  On discharge, her pain was well controlled. Vaginal bleeding is decreased and similar to her menses.  Voiding without difficulty.  Ambulating well and tolerating a normal diet.  No fever. Both breast and formula. She is concerned with low milk supply. Infant is stable. She was discharged on post-partum day #2. Contraception plan: nexplanon. Post partum depression education was provided.         D/C Physical Exam:     Vitals:    17 0300 17 0900 17 1513 17 2330   BP: 98/52 99/52 99/52 96/52   Pulse:  70 74    Resp: 16 16 16 16   Temp: 98.4  F (36.9  C) 98  F (36.7  C) 98.5  F (36.9  C) 98.4  F (36.9  C)   TempSrc: Oral Oral Oral Oral     GENERAL:         healthy, alert and no distress  RESPIRATORY:   No increased work of breathing, good air exchange, clear to auscultation bilaterally, no crackles or wheezing   CARDIOVASCULAR:   Regular rate and rhythm, normal S1 and S2, no S3 or S4, and no murmur noted   ABDOMEN:   Normal bowel sounds, soft,  non-distended, non-tender, no masses palpated  Fundal height at level of umbilicus + 1 cm.  Firm and non-tender.   EXTREMITIES:          no edema, non-tender     Post-partum hemoglobin:   Hemoglobin   Date Value Ref Range Status   12/09/2017 10.9 (L) 11.7 - 15.7 g/dL Final           Discharge Instructions and Follow-Up:   Discharge diet: Regular   Discharge activity: Activity as tolerated   Discharge follow-up: Follow up with primary care provider in 6 weeks   Wound care: Drink plenty of fluids          Discharge Disposition:   Discharged to home        Joycelyn Stahl MD

## 2017-12-14 ENCOUNTER — APPOINTMENT (OUTPATIENT)
Dept: CT IMAGING | Facility: CLINIC | Age: 37
End: 2017-12-14
Attending: EMERGENCY MEDICINE
Payer: COMMERCIAL

## 2017-12-14 ENCOUNTER — HOSPITAL ENCOUNTER (EMERGENCY)
Facility: CLINIC | Age: 37
Discharge: HOME | End: 2017-12-14
Attending: EMERGENCY MEDICINE | Admitting: EMERGENCY MEDICINE
Payer: COMMERCIAL

## 2017-12-14 VITALS
HEART RATE: 54 BPM | RESPIRATION RATE: 16 BRPM | DIASTOLIC BLOOD PRESSURE: 79 MMHG | TEMPERATURE: 98.6 F | SYSTOLIC BLOOD PRESSURE: 147 MMHG | OXYGEN SATURATION: 100 %

## 2017-12-14 DIAGNOSIS — R10.9 LEFT SIDED ABDOMINAL PAIN: ICD-10-CM

## 2017-12-14 LAB
ALBUMIN UR-MCNC: NEGATIVE MG/DL
ANION GAP SERPL CALCULATED.3IONS-SCNC: 7 MMOL/L (ref 3–14)
APPEARANCE UR: CLEAR
BASOPHILS # BLD AUTO: 0 10E9/L (ref 0–0.2)
BASOPHILS NFR BLD AUTO: 0.1 %
BILIRUB UR QL STRIP: NEGATIVE
BUN SERPL-MCNC: 11 MG/DL (ref 7–30)
CALCIUM SERPL-MCNC: 8.6 MG/DL (ref 8.5–10.1)
CHLORIDE SERPL-SCNC: 109 MMOL/L (ref 94–109)
CO2 SERPL-SCNC: 24 MMOL/L (ref 20–32)
COLOR UR AUTO: ABNORMAL
CREAT SERPL-MCNC: 0.32 MG/DL (ref 0.52–1.04)
DIFFERENTIAL METHOD BLD: NORMAL
DIFFERENTIAL METHOD BLD: NORMAL
EOSINOPHIL # BLD AUTO: 0.1 10E9/L (ref 0–0.7)
EOSINOPHIL NFR BLD AUTO: 1.1 %
ERYTHROCYTE [DISTWIDTH] IN BLOOD BY AUTOMATED COUNT: 12.6 % (ref 10–15)
ERYTHROCYTE [DISTWIDTH] IN BLOOD BY AUTOMATED COUNT: NORMAL % (ref 10–15)
GFR SERPL CREATININE-BSD FRML MDRD: >90 ML/MIN/1.7M2
GLUCOSE SERPL-MCNC: 83 MG/DL (ref 70–99)
GLUCOSE UR STRIP-MCNC: NEGATIVE MG/DL
HCT VFR BLD AUTO: 35.1 % (ref 35–47)
HCT VFR BLD AUTO: NORMAL % (ref 35–47)
HGB BLD-MCNC: 12.1 G/DL (ref 11.7–15.7)
HGB BLD-MCNC: NORMAL G/DL (ref 11.7–15.7)
HGB UR QL STRIP: NEGATIVE
IMM GRANULOCYTES # BLD: 0 10E9/L (ref 0–0.4)
IMM GRANULOCYTES NFR BLD: 0.4 %
KETONES UR STRIP-MCNC: NEGATIVE MG/DL
LEUKOCYTE ESTERASE UR QL STRIP: NEGATIVE
LYMPHOCYTES # BLD AUTO: 1.4 10E9/L (ref 0.8–5.3)
LYMPHOCYTES NFR BLD AUTO: 17.2 %
MCH RBC QN AUTO: 31.7 PG (ref 26.5–33)
MCH RBC QN AUTO: NORMAL PG (ref 26.5–33)
MCHC RBC AUTO-ENTMCNC: 34.5 G/DL (ref 31.5–36.5)
MCHC RBC AUTO-ENTMCNC: NORMAL G/DL (ref 31.5–36.5)
MCV RBC AUTO: 92 FL (ref 78–100)
MCV RBC AUTO: NORMAL FL (ref 78–100)
MONOCYTES # BLD AUTO: 0.5 10E9/L (ref 0–1.3)
MONOCYTES NFR BLD AUTO: 6 %
MUCOUS THREADS #/AREA URNS LPF: PRESENT /LPF
NEUTROPHILS # BLD AUTO: 6.2 10E9/L (ref 1.6–8.3)
NEUTROPHILS NFR BLD AUTO: 75.2 %
NITRATE UR QL: NEGATIVE
NRBC # BLD AUTO: 0 10*3/UL
NRBC BLD AUTO-RTO: 0 /100
PH UR STRIP: 5.5 PH (ref 5–7)
PLATELET # BLD AUTO: 235 10E9/L (ref 150–450)
PLATELET # BLD AUTO: NORMAL 10E9/L (ref 150–450)
POTASSIUM SERPL-SCNC: 4.9 MMOL/L (ref 3.4–5.3)
RBC # BLD AUTO: 3.82 10E12/L (ref 3.8–5.2)
RBC # BLD AUTO: NORMAL 10E12/L (ref 3.8–5.2)
RBC #/AREA URNS AUTO: <1 /HPF (ref 0–2)
SODIUM SERPL-SCNC: 140 MMOL/L (ref 133–144)
SOURCE: ABNORMAL
SP GR UR STRIP: 1 (ref 1–1.03)
UROBILINOGEN UR STRIP-MCNC: NORMAL MG/DL (ref 0–2)
WBC # BLD AUTO: 8.3 10E9/L (ref 4–11)
WBC # BLD AUTO: NORMAL 10E9/L (ref 4–11)
WBC #/AREA URNS AUTO: <1 /HPF (ref 0–2)

## 2017-12-14 PROCEDURE — 74177 CT ABD & PELVIS W/CONTRAST: CPT

## 2017-12-14 PROCEDURE — 25000128 H RX IP 250 OP 636: Performed by: EMERGENCY MEDICINE

## 2017-12-14 PROCEDURE — 96361 HYDRATE IV INFUSION ADD-ON: CPT | Performed by: EMERGENCY MEDICINE

## 2017-12-14 PROCEDURE — 25000125 ZZHC RX 250: Performed by: EMERGENCY MEDICINE

## 2017-12-14 PROCEDURE — 85025 COMPLETE CBC W/AUTO DIFF WBC: CPT | Performed by: EMERGENCY MEDICINE

## 2017-12-14 PROCEDURE — 96374 THER/PROPH/DIAG INJ IV PUSH: CPT | Performed by: EMERGENCY MEDICINE

## 2017-12-14 PROCEDURE — 81001 URINALYSIS AUTO W/SCOPE: CPT | Performed by: EMERGENCY MEDICINE

## 2017-12-14 PROCEDURE — 99285 EMERGENCY DEPT VISIT HI MDM: CPT | Mod: 25 | Performed by: EMERGENCY MEDICINE

## 2017-12-14 PROCEDURE — 99285 EMERGENCY DEPT VISIT HI MDM: CPT | Mod: Z6 | Performed by: EMERGENCY MEDICINE

## 2017-12-14 PROCEDURE — 80048 BASIC METABOLIC PNL TOTAL CA: CPT | Performed by: EMERGENCY MEDICINE

## 2017-12-14 RX ORDER — MORPHINE SULFATE 4 MG/ML
2-4 INJECTION, SOLUTION INTRAMUSCULAR; INTRAVENOUS
Status: DISCONTINUED | OUTPATIENT
Start: 2017-12-14 | End: 2017-12-14 | Stop reason: HOSPADM

## 2017-12-14 RX ORDER — MORPHINE SULFATE 4 MG/ML
4 INJECTION, SOLUTION INTRAMUSCULAR; INTRAVENOUS ONCE
Status: COMPLETED | OUTPATIENT
Start: 2017-12-14 | End: 2017-12-14

## 2017-12-14 RX ORDER — IOPAMIDOL 755 MG/ML
100 INJECTION, SOLUTION INTRAVASCULAR ONCE
Status: COMPLETED | OUTPATIENT
Start: 2017-12-14 | End: 2017-12-14

## 2017-12-14 RX ADMIN — MORPHINE SULFATE 4 MG: 4 INJECTION, SOLUTION INTRAMUSCULAR; INTRAVENOUS at 17:24

## 2017-12-14 RX ADMIN — SODIUM CHLORIDE 51 ML: 9 INJECTION, SOLUTION INTRAVENOUS at 19:01

## 2017-12-14 RX ADMIN — SODIUM CHLORIDE 1000 ML: 9 INJECTION, SOLUTION INTRAVENOUS at 17:24

## 2017-12-14 RX ADMIN — IOPAMIDOL 74 ML: 755 INJECTION, SOLUTION INTRAVENOUS at 19:00

## 2017-12-14 ASSESSMENT — ENCOUNTER SYMPTOMS
FLANK PAIN: 0
SHORTNESS OF BREATH: 0
RHINORRHEA: 0
DIARRHEA: 0
COUGH: 1
DYSURIA: 0
ABDOMINAL PAIN: 1
HEADACHES: 1
NAUSEA: 0
CHILLS: 1
DIFFICULTY URINATING: 0
VOMITING: 0
CONSTIPATION: 0

## 2017-12-14 NOTE — ED PROVIDER NOTES
History     Chief Complaint   Patient presents with     Abdominal Pain     6 days postpartum NVD. Pt having persistent LLQ pain. Denies excessive bleeding.      HPI  Christine Glasgow is a 37 year old female who presents to the Emergency Department for evaluation of abdominal pain. Patient is 6 days postpartum from a NVD and reports that she has had persistent L sided abdominal pain since then. She denies excessive bleeding and notes that lochia is decreasing; she has used 3-4 pads today. She has had no other vaginal discharge.    Patient is currently breastfeeding. She has been taking ibuprofen or Tylenol every 4 hours for this pain. She reports a normal delivery, she did have GBS and had IV antibiotics in labor.  She has had a headache, some cough, and chills. She denies a history of abdominal surgeries.     She denies nausea, vomiting, or diarrhea. She does not typically have problems with constipation. Her last bowel movement was at about 11:00 AM today and was normal. She denies dysuria. No fevers, though she has felt chilled at times.     History reviewed. No pertinent past medical history.    History reviewed. No pertinent surgical history.    No family history on file.    Social History   Substance Use Topics     Smoking status: Never Smoker     Smokeless tobacco: Never Used     Alcohol use No       Current Facility-Administered Medications   Medication     morphine (PF) injection 2-4 mg     Current Outpatient Prescriptions   Medication     acetaminophen (TYLENOL) 325 MG tablet     ibuprofen (ADVIL/MOTRIN) 800 MG tablet     senna-docusate (SENOKOT-S;PERICOLACE) 8.6-50 MG per tablet     Prenatal Vit-Fe Fumarate-FA (PRENATAL MULTIVITAMIN PLUS IRON) 27-0.8 MG TABS per tablet     VITAMIN D, CHOLECALCIFEROL, PO     ferrous sulfate (IRON) 325 (65 FE) MG tablet        Allergies   Allergen Reactions     Ciprofloxacin Hives         I have reviewed the Medications, Allergies, Past Medical and Surgical History,  and Social History in the Epic system.    Review of Systems   Constitutional: Positive for chills.   HENT: Negative for rhinorrhea.    Respiratory: Positive for cough. Negative for shortness of breath.    Cardiovascular: Negative for chest pain.   Gastrointestinal: Positive for abdominal pain (LLQ). Negative for constipation, diarrhea, nausea and vomiting.   Genitourinary: Positive for vaginal bleeding. Negative for difficulty urinating, dysuria, flank pain and vaginal discharge.   Neurological: Positive for headaches.   All other systems reviewed and are negative.      Physical Exam   BP: 122/43  Pulse: 58  Temp: 98.1  F (36.7  C)  Resp: 16  SpO2: 99 %      Physical Exam   Constitutional:    female, alert, cooperative, seems somewhat uncomfortable   HENT:   Head: Atraumatic.   Mouth/Throat: Oropharynx is clear and moist. No oropharyngeal exudate.   Eyes: Pupils are equal, round, and reactive to light. No scleral icterus.   Cardiovascular: Normal rate, regular rhythm, normal heart sounds and intact distal pulses.    No murmur heard.  Pulmonary/Chest: Effort normal and breath sounds normal. No respiratory distress.   Abdominal: Soft. There is tenderness.   Soft, slightly protruberant, TTP in very lateral portion of the L abdomen in LUQ and LLQ, no midline or R sided TTP, hypoactive bowel sounds   Genitourinary:   Genitourinary Comments: Pelvic exam: swollen external genitalia.  Spec exam shows moderate amount of bloody discharge. No active bleeding seen.  No unusual TTP upon bimanual exam or palpation of the uterus.    Musculoskeletal: She exhibits no edema or tenderness.   Skin: Skin is warm. No rash noted. She is not diaphoretic.   Nursing note and vitals reviewed.      ED Course   4:26 PM  The patient was seen and examined by Ellyn Campa MD in Room 5.     ED Course     Procedures             Critical Care time:  none             Results for orders placed or performed during the hospital encounter of  12/14/17 (from the past 24 hour(s))   CBC with platelets differential   Result Value Ref Range    WBC Unsatisfactory specimen - clotted 4.0 - 11.0 10e9/L    RBC Count Unsatisfactory specimen - clotted 3.8 - 5.2 10e12/L    Hemoglobin Unsatisfactory specimen - clotted 11.7 - 15.7 g/dL    Hematocrit Unsatisfactory specimen - clotted 35.0 - 47.0 %    MCV Unsatisfactory specimen - clotted 78 - 100 fl    MCH Unsatisfactory specimen - clotted 26.5 - 33.0 pg    MCHC Unsatisfactory specimen - clotted 31.5 - 36.5 g/dL    RDW Unsatisfactory specimen - clotted 10.0 - 15.0 %    Platelet Count Unsatisfactory specimen - clotted 150 - 450 10e9/L    Diff Method Unsatisfactory specimen - clotted    Basic metabolic panel   Result Value Ref Range    Sodium 140 133 - 144 mmol/L    Potassium 4.9 3.4 - 5.3 mmol/L    Chloride 109 94 - 109 mmol/L    Carbon Dioxide 24 20 - 32 mmol/L    Anion Gap 7 3 - 14 mmol/L    Glucose 83 70 - 99 mg/dL    Urea Nitrogen 11 7 - 30 mg/dL    Creatinine 0.32 (L) 0.52 - 1.04 mg/dL    GFR Estimate >90 >60 mL/min/1.7m2    GFR Estimate If Black >90 >60 mL/min/1.7m2    Calcium 8.6 8.5 - 10.1 mg/dL   UA with Microscopic reflex to Culture   Result Value Ref Range    Color Urine Straw     Appearance Urine Clear     Glucose Urine Negative NEG^Negative mg/dL    Bilirubin Urine Negative NEG^Negative    Ketones Urine Negative NEG^Negative mg/dL    Specific Gravity Urine 1.003 1.003 - 1.035    Blood Urine Negative NEG^Negative    pH Urine 5.5 5.0 - 7.0 pH    Protein Albumin Urine Negative NEG^Negative mg/dL    Urobilinogen mg/dL Normal 0.0 - 2.0 mg/dL    Nitrite Urine Negative NEG^Negative    Leukocyte Esterase Urine Negative NEG^Negative    Source Midstream Urine     WBC Urine <1 0 - 2 /HPF    RBC Urine <1 0 - 2 /HPF    Mucous Urine Present (A) NEG^Negative /LPF   CBC with platelets differential   Result Value Ref Range    WBC 8.3 4.0 - 11.0 10e9/L    RBC Count 3.82 3.8 - 5.2 10e12/L    Hemoglobin 12.1 11.7 - 15.7 g/dL     Hematocrit 35.1 35.0 - 47.0 %    MCV 92 78 - 100 fl    MCH 31.7 26.5 - 33.0 pg    MCHC 34.5 31.5 - 36.5 g/dL    RDW 12.6 10.0 - 15.0 %    Platelet Count 235 150 - 450 10e9/L    Diff Method Automated Method     % Neutrophils 75.2 %    % Lymphocytes 17.2 %    % Monocytes 6.0 %    % Eosinophils 1.1 %    % Basophils 0.1 %    % Immature Granulocytes 0.4 %    Nucleated RBCs 0 0 /100    Absolute Neutrophil 6.2 1.6 - 8.3 10e9/L    Absolute Lymphocytes 1.4 0.8 - 5.3 10e9/L    Absolute Monocytes 0.5 0.0 - 1.3 10e9/L    Absolute Eosinophils 0.1 0.0 - 0.7 10e9/L    Absolute Basophils 0.0 0.0 - 0.2 10e9/L    Abs Immature Granulocytes 0.0 0 - 0.4 10e9/L    Absolute Nucleated RBC 0.0    CT Abdomen Pelvis w Contrast    Narrative    CT ABDOMEN AND PELVIS WITH CONTRAST 12/14/2017 7:04 PM     HISTORY: Left-sided abdominal pain, patient is six days status post  vaginal delivery. Evaluate for any sign of abscess or bleeding or  diverticulitis.     TECHNIQUE: 74 mL Isovue-370. Radiation dose for this scan was reduced  using automated exposure control, adjustment of the mA and/or kV  according to patient size, or iterative reconstruction technique.    COMPARISON: None.    FINDINGS: Scans through the lung bases are unremarkable.    The liver, gallbladder, spleen, and pancreas are unremarkable. No  adrenal lesions. No kidney stones or hydronephrosis. No  retroperitoneal adenopathy.    Scans through the pelvis demonstrate a post gravid uterus with fluid  density material within the endometrial canal. This measures  approximately 3 cm in diameter. This may represent residual fluid from  recent delivery. Cannot exclude retained products of conception on CT  scan. Recommend clinical correlation.    Bladder is unremarkable. No evidence of abscess. No evidence of  diverticulitis. The appendix is not definitely identified, but there  are no inflammatory changes in the right lower quadrant. No thickened  bowel wall or evidence of bowel obstruction.  There is no free fluid or  free air. No evidence of hemorrhage or abscess.    The right gonadal veins contain contrast. The left gonadal veins do  not contain contrast. This may just be that contrast has not yet made  it to the left gonadal veins. No definite thickening or enlargement of  the gonadal veins on the left. Doubt left gonadal vein thrombosis, but  difficult to exclude as there is not contrast in the left gonadal  veins.      Impression    IMPRESSION:  1. Post gravid uterus. There is some fluid within the endometrial  canal which may be fluid or blood. CT cannot differentiate this from  retained products of conception. Recommend clinical correlation.  2. The left gonadal veins have not yet filled with contrast. The right  gonadal veins have and appear normal. The left gonadal veins are  similar in size to the right and I think gonadal vein thrombosis is  unlikely, but difficult to exclude on this exam.  3. Otherwise unremarkable.              Assessments & Plan (with Medical Decision Making)   This is a 37-year-old female who is 6 days status post vaginal delivery who presents to the ED for left abdominal pain.  She had a fairly unremarkable pregnancy with exception of advanced maternal age, she did have group B strep bacteria and was treated with antibiotics during labor.  She is  she has not had a  sections.  She comes in reporting left-sided abdominal pain which has been ongoing since her delivery 6 days ago.  It is constant and ongoing.  No fevers, decreasing lochia.  She is having bowel movements.    Differential diagnosis could include the usual none OB issues including constipation, unlikely diverticulitis, kidney stone, pyelonephritis.  Also need to consider endometritis, ovarian torsion, or other more unusual complication of a vaginal delivery such as internal bleeding or internal abscess.  I think this is less likely as she is not febrile and has normal vital signs, but still  "needs to be considered in the differential.    We did establish IV access and we did draw blood for laboratory analysis.  CBC shows a normal white count of 8.3, hemoglobin is 12.1, BMP is within normal limits, pelvic exam shows no unusual findings for someone so recently post partum.   We did order a cath UA specimen.  3 nurses attempted to do a straight cath on the patient.  This was unsuccessful as her anatomy is still quite distorted secondary to her recent vaginal delivery.  She is able to urinate volitionally.  She did give us a \"clean catch\" specimen after wiping with the towelettes.  As this was the only urine sample we could obtain, we did send this to lab and this does not show any evidence of blood or any evidence of infection.    We did elect to do a CT scan based on her complaint.  It was decided that the abdomen needed to be imaged to better delineate her symptoms and their cause.  Given the laterality of her complaints, I think it is less likely to have an OB/GYN cause of her symptoms, so we decided to go with a CT scan.  This demonstrates the following: Post gravid uterus, there is some fluid within the endometrial canal which could be fluid or blood which cannot be differentiated this from retained products of conception.  I do not believe that she has endometritis or retained products of conception.  The patient really does not have much in the way of uterine tenderness to palpation, so I think that this is simple normal expected findings 6 days post vaginal delivery.  The other finding on the CT scan is that the left gonadal veins have not yet filled with contrast and the right gonadal veins have and appear normal.  Radiology makes note of the fact that gonadal vein thrombosis is unlikely but cannot be excluded.  There is nothing else on the CT scan that would explain her pain.  She does not have any sign of severe constipation or diverticulitis or renal stone.  There is no sign of free fluid or " abscess.  I did consult the gynecology team to come and see the patient.  At this point they recommend that the patient monitor her fever curve very carefully.  They advised that she check her temperature every 4 hours for the next 24 hours.  She should write down her temperature values.  The OB/GYN clinic is going to call her tomorrow at 2 PM to check on her and see what her fever curve has been.  Certainly if she develops worsening symptoms or becomes sicker, she can come back to the Emergency Department or call the day emergency nurse line for OB or the night urgent line for OB.  OB did provide the patient with these numbers, and I also put these in her discharge instructions.  She can use Tylenol and Motrin as needed to help with discomfort and she will follow-up with the clinic as needed.    This part of the document was transcribed by Julianne Bolivar Medical Scribe.      I have reviewed the nursing notes.    I have reviewed the findings, diagnosis, plan and need for follow up with the patient.    Discharge Medication List as of 12/14/2017  9:13 PM          Final diagnoses:   Left sided abdominal pain   Normal spontaneous vaginal delivery   I, Julianne Bolivar, am serving as a trained medical scribe to document services personally performed by Ellyn Campa MD, based on the provider's statements to me.      I, Ellyn Campa MD, was physically present and have reviewed and verified the accuracy of this note documented by Julianne Bolivar.        12/14/2017   [facility], [place], EMERGENCY DEPARTMENT     Ellyn Campa MD  12/14/17 [id number]

## 2017-12-14 NOTE — ED AVS SNAPSHOT
[facility], Emergency Department    [address]    [place] [address]    Phone:  [phone]    Fax:  [phone]                                       Christine Glasgow   MRN: [id number]    Department:  [facility], Emergency Department   Date of Visit:  12/14/2017           Patient Information     Date Of Birth          1980        Your diagnoses for this visit were:     Left sided abdominal pain     Normal spontaneous vaginal delivery        You were seen by Ellyn Campa MD.        Discharge Instructions       You have been seen in the ER for abdominal pain.  Your urine test is normal so there is no sign of infection.  Your blood tests are normal.  Your CT scan did not show any definite problems.     The OB specialists have seen you and recommended that you check your temperature every 4 hours for the next 24 hours. Write down these values.  The OB clinic will call you tomorrow at 2:00 PM to see how you are doing.  You can take tylenol and motrin to help with pain and discomfort.      If you have any issues during the day, we recommend that you call the OB Day Emergency Nurse Line at [phone].  If you need to reach someone overnight, you can call the night urgent nurse line at [phone].    24 Hour Appointment Hotline       To make an appointment at any [facility], call [phone] ([phone]). If you don't have a family doctor or clinic, we will help you find one. [facility] clinics are conveniently located to serve the needs of you and your family.             Review of your medicines      Our records show that you are taking the medicines listed below. If these are incorrect, please call your family doctor or clinic.        Dose / Directions Last dose taken    acetaminophen 325 MG tablet   Commonly known as:  TYLENOL   Dose:  650 mg   Quantity:  100 tablet        Take 2 tablets (650 mg) by mouth every 4 hours as needed for mild pain   Refills:  0         ferrous sulfate 325 (65 FE) MG tablet   Commonly known as:  IRON   Dose:  325 mg   Quantity:  100 tablet        Take 1 tablet (325 mg) by mouth daily (with breakfast)   Refills:  0        ibuprofen 800 MG tablet   Commonly known as:  ADVIL/MOTRIN   Dose:  800 mg   Quantity:  90 tablet        Take 1 tablet (800 mg) by mouth every 6 hours as needed for other (cramping)   Refills:  0        prenatal multivitamin plus iron 27-0.8 MG Tabs per tablet   Dose:  1 tablet        Take 1 tablet by mouth daily   Refills:  0        senna-docusate 8.6-50 MG per tablet   Commonly known as:  SENOKOT-S;PERICOLACE   Dose:  1 tablet   Quantity:  100 tablet        Take 1 tablet by mouth 2 times daily as needed for constipation   Refills:  0        VITAMIN D (CHOLECALCIFEROL) PO   Dose:  2000 Units        Take 2,000 Units by mouth daily   Refills:  0                Procedures and tests performed during your visit     Procedure/Test Number of Times Performed    Basic metabolic panel 1    CBC with platelets differential 2    CT Abdomen Pelvis w Contrast 1    Pulse oximetry 1    UA with Microscopic reflex to Culture 1      Orders Needing Specimen Collection     None      Pending Results     Date and Time Order Name Status Description    12/14/2017 1755 CT Abdomen Pelvis w Contrast Preliminary             Pending Culture Results     No orders found from 12/12/2017 to 12/15/2017.            Pending Results Instructions     If you had any lab results that were not finalized at the time of your Discharge, you can call the ED Lab Result RN at [phone]. You will be contacted by this team for any positive Lab results or changes in treatment. The nurses are available 7 days a week from 10A to 6:30P.  You can leave a message 24 hours per day and they will return your call.        Thank you for choosing [facility]       Thank you for choosing [facility] for your care. Our goal is always to provide you with excellent care. Hearing back from our  "patients is one way we can continue to improve our services. Please take a few minutes to complete the written survey that you may receive in the mail after you visit with us. Thank you!        [facility] Information     [employer] lets you send messages to your doctor, view your test results, renew your prescriptions, schedule appointments and more. To sign up, go to [email or web address] . Click on \"Log in\" on the left side of the screen, which will take you to the Welcome page. Then click on \"Sign up Now\" on the right side of the page.     You will be asked to enter the access code listed below, as well as some personal information. Please follow the directions to create your username and password.     Your access code is: [email or web address]  Expires: 2018 12:09 AM     Your access code will  in 90 days. If you need help or a new code, please call your [facility] clinic or [phone].        Care EveryWhere ID     This is your Care EveryWhere ID. This could be used by other organizations to access your [facility] medical records  [id number]        Equal Access to Services     YOUNG PATEL : Hadvaleria Marie, jose lewis, sharyn lindsay, kathy blount . So [facility] [phone].    ATENCIÓN: Si habla español, tiene a wilson disposición servicios gratuitos de asistencia lingüística. Paige al [phone].    We comply with applicable federal civil rights laws and [place] laws. We do not discriminate on the basis of race, color, national origin, age, disability, sex, sexual orientation, or gender identity.            After Visit Summary       This is your record. Keep this with you and show to your community pharmacist(s) and doctor(s) at your next visit.                  "

## 2017-12-14 NOTE — ED AVS SNAPSHOT
[facility], Emergency Department    [address]    [place] [address]    Phone:  [phone]    Fax:  [phone]                                       Christine Glasgow   MRN: [id number]    Department:  [facility], Emergency Department   Date of Visit:  12/14/2017           After Visit Summary Signature Page     I have received my discharge instructions, and my questions have been answered. I have discussed any challenges I see with this plan with the nurse or doctor.    ..........................................................................................................................................  Patient/Patient Representative Signature      ..........................................................................................................................................  Patient Representative Print Name and Relationship to Patient    ..................................................               ................................................  Date                                            Time    ..........................................................................................................................................  Reviewed by Signature/Title    ...................................................              ..............................................  Date                                                            Time

## 2017-12-15 ENCOUNTER — TELEPHONE (OUTPATIENT)
Dept: OBGYN | Facility: CLINIC | Age: 37
End: 2017-12-15

## 2017-12-15 NOTE — DISCHARGE INSTRUCTIONS
You have been seen in the ER for abdominal pain.  Your urine test is normal so there is no sign of infection.  Your blood tests are normal.  Your CT scan did not show any definite problems.     The OB specialists have seen you and recommended that you check your temperature every 4 hours for the next 24 hours. Write down these values.  The OB clinic will call you tomorrow at 2:00 PM to see how you are doing.  You can take tylenol and motrin to help with pain and discomfort.      If you have any issues during the day, we recommend that you call the OB Day Emergency Nurse Line at [phone].  If you need to reach someone overnight, you can call the night urgent nurse line at [phone].

## 2017-12-15 NOTE — TELEPHONE ENCOUNTER
Spoke with Christine via   Services. She reports she has NOT taken her temperature.  She reports she has had chills all day long. Has had no change in pain.  Nurse asked her to take temperature now, while on phone, but she declined.    Nurse advised she go ED as she likely has a fever. She is declining. Nurse stressing importance of returning to ED. She dismissed nurse stating she thinks she is OK but if she feels worse she will go to ED.

## 2017-12-15 NOTE — TELEPHONE ENCOUNTER
----- Message from Sheryl Perez MD sent at 12/14/2017  8:34 PM CST -----  Regarding: please call this patient at 2pm Friday with   She is a [facility] patient who is about 6 days post partum and was seen in ED with some suspicion for a gonadal vein thrombosis. (CT inconclusive) She is to check her temp Q 4 hours while awake and report to you at 2pm. If she's been afeb (<100.4) and pain is manageable then have her see [facility] next week.   If fever or worsening pain present to ED for further evaluation.     amita briceno.     -- Chris

## 2017-12-15 NOTE — CONSULTS
Gynecology Consult Note    Patient Summary:  Christine Glasgow is a 37 year old female with postpartum pelvic pain seen at the request of Dr. Campa, ED staff.      HPI: Christine is a 37 year old , PPD#6 . Her delivery and pregnancy were uncomplicated. She presents to the ED with left-sided pelvic pain that started almost immediately after her delivery and has been intermittent, but persistent, since then. It is not worsening. She says that the pain is controlled with ibuprofen and acetaminophen, but she is worried because it has not gotten better and always comes back after the pain medicines wear off. It does get worse with breast feeding, but only when she breast feeds on the left side.     She has not felt feverish at all. She has a normal appetite. Daily bowel movements, does not feel constipated, and no diarrhea or vomiting. No dysuria. Lochia have been minimal and decreasing, no foul smell. Also no SOB, chest pain. Mild head ache.     ROS: as per HPI.     PMH:   Denies history of VTE, bleeding disorders, serious infections, diabetes hypertension    PSHx:   No abdominal surgeries, reviewed with patient    OB Hx:    uncomplicated x4    Medications:       No current facility-administered medications on file prior to encounter.   Current Outpatient Prescriptions on File Prior to Encounter:  acetaminophen (TYLENOL) 325 MG tablet Take 2 tablets (650 mg) by mouth every 4 hours as needed for mild pain   ibuprofen (ADVIL/MOTRIN) 800 MG tablet Take 1 tablet (800 mg) by mouth every 6 hours as needed for other (cramping)   senna-docusate (SENOKOT-S;PERICOLACE) 8.6-50 MG per tablet Take 1 tablet by mouth 2 times daily as needed for constipation   Prenatal Vit-Fe Fumarate-FA (PRENATAL MULTIVITAMIN PLUS IRON) 27-0.8 MG TABS per tablet Take 1 tablet by mouth daily   VITAMIN D, CHOLECALCIFEROL, PO Take 2,000 Units by mouth daily   ferrous sulfate (IRON) 325 (65 FE) MG tablet Take 1 tablet (325 mg) by mouth  daily (with breakfast)       Allergies: ciprofloxacin, hives     Allergies   Allergen Reactions     Ciprofloxacin Hives       Social History:   , good family support from . No EtOH, drugs, tobacco.       Physical Exam:   Vitals:    17 1613 17 1823   BP: 122/43    Pulse: 58    Resp: 16    Temp: 98.1  F (36.7  C)    TempSrc: Oral    SpO2: 99% 99%      Gen:  CV: well perfused  Pulm: breathing comfortably  Abd: generally non-tender although minimal tendernes LLQ, no rebound, non-distended, +BS  Pelvis: Small dark red lochia on perineum. Cervix minimally dilated. Some tenderness at introitus. No fundal tenderness or R adnexal tenderness; moderate tenderness without fullness or masses in left adnexa. Bladder feels distended.   Extremities: non-tender, no erythema; no edema    Labs:  Results for MARK DODGE (MRN [id number]) as of 2017 20:20   2017 17:15   Sodium 140   Potassium 4.9   Chloride 109   Carbon Dioxide 24   Urea Nitrogen 11   Creatinine 0.32 (L)   GFR Estimate >90   Calcium 8.6   Anion Gap 7   Results for MARK DODGE (MRN [id number]) as of 2017 20:20   2017 18:15   WBC 8.3   Hemoglobin 12.1   Hematocrit 35.1   Platelet Count 235   RBC Count 3.82   MCV 92   MCH 31.7   MCHC 34.5   RDW 12.6   Diff Method Automated Method   % Neutrophils 75.2     CT AP IMPRESSION:  1. Post gravid uterus. There is some fluid within the endometrial  canal which may be fluid or blood. CT cannot differentiate this from  retained products of conception. Recommend clinical correlation.  2. The left gonadal veins have not yet filled with contrast. The right  gonadal veins have and appear normal. The left gonadal veins are  similar in size to the right and I think gonadal vein thrombosis is  unlikely, but difficult to exclude on this exam.  3. Otherwise unremarkable.    A&P: 37 year old  PPD#6 from uncomplicated  with left-sided pelvic pain and CT findings  suggestive but not diagnostic of ovarian vein thrombus.     Most ovarian vein thromboses are diagnosed in the setting of persistent postpartum fevers, which Ms Glasgow has not had. If this were the case, she would require anticoagulation and inpatient antibiotic administration. However, with a low likelihood of thrombosis on CT report and no fevers, we think the risks of treatment (even just anticoagulation) outweigh the benefits at this time.     Other than ovarian vein thrombus, there are several possibilities. Nephrolithiasis has been essentially ruled out with normal CT and UA. Endometritis seems highly unlikely without a leukocytosis and without fundal tenderness. It could simply be normal postpartum cramping, especially because of its temporal relationship to breastfeeding (which often increases in intensity as parity increases), GI (gas, early constipation). We also considered ovarian torsion but there are no suggestions of cysts on , and Ms Glasgow's pain, is not characteristic (intense but not excruciating; no association with nausea and vomiting). Furthermore it is very unusual to have torsion in the absence of ovarian mass.     The plan we set in place is as follows:   - Christine will take her temperature every 4 hours (more or less, e.g while waking to feed baby) and record.   - If she has fevers >100.4 F overnight she should call the Gyn on-call number (provided)   - If pain suddenly worsens and is not at all controlled with acetaminophen or ibuprofen, she should come to the ED  - Otherwise, an RN from the [facility] clinic will call her tomorrow at 2 pm (phone number verified with patient, she is available at that time) to review her symptoms and discuss the need for further workup or imaging.    Further imaging would likely mean repeat CT or possibly MRI, but this can be decided based on future symptoms and findings.     Seen with Dr. Perez. Discussed with Dr. Campa (ED staff).   Thank you for this  consult.  Please do not hesitate to contact us with concerns or questions.       Ariannaalexander Champagne, PGY3  OB/Gyn  12/14/2017, 8:19 PM    I have seen and examined the patient with the resident. I have reviewed, edited, and agree with the note.   My findings are:appears well and comfortable. Abdomen soft NT ND.     Sheryl Perez MD

## 2017-12-22 ENCOUNTER — TELEPHONE (OUTPATIENT)
Dept: FAMILY MEDICINE | Facility: CLINIC | Age: 37
End: 2017-12-22

## 2017-12-22 ENCOUNTER — OFFICE VISIT (OUTPATIENT)
Dept: FAMILY MEDICINE | Facility: CLINIC | Age: 37
End: 2017-12-22
Payer: COMMERCIAL

## 2017-12-22 DIAGNOSIS — O92.70 LACTATION PROBLEM: Primary | ICD-10-CM

## 2017-12-22 PROCEDURE — 99204 OFFICE O/P NEW MOD 45 MIN: CPT | Performed by: NURSE PRACTITIONER

## 2017-12-22 NOTE — TELEPHONE ENCOUNTER
She can go ahead and open this and use it.  If there is any problem then with insurance, we can refer to your conversation with the representative.  Could you also offer her a two-three week follow-up visit?  Thank you!  SUJATA Otero

## 2017-12-22 NOTE — NURSING NOTE
Chief Complaint   Patient presents with     Lactation Consult       Initial LMP 02/28/2017 There is no height or weight on file to calculate BMI.  Medication Reconciliation: complete       Nic Samuels MA

## 2017-12-22 NOTE — MR AVS SNAPSHOT
"              After Visit Summary   2017    Christine Glasgow    MRN: [id number]           Patient Information     Date Of Birth          1980        Visit Information        Provider Department      2017 11:00 AM Inez Obando; Mary Lou Monroy, APRN CNP [facility]        Today's Diagnoses     Lactation problem    -  1       Follow-ups after your visit        Who to contact     If you have questions or need follow up information about today's clinic visit or your schedule please contact [facility] directly at [phone].  Normal or non-critical lab and imaging results will be communicated to you by HuntForcehart, letter or phone within 4 business days after the clinic has received the results. If you do not hear from us within 7 days, please contact the clinic through HuntForcehart or phone. If you have a critical or abnormal lab result, we will notify you by phone as soon as possible.  Submit refill requests through [employer]Af[employer] or call your pharmacy and they will forward the refill request to us. Please allow 3 business days for your refill to be completed.          Additional Information About Your Visit        MyChart Information     [employer] lets you send messages to your doctor, view your test results, renew your prescriptions, schedule appointments and more. To sign up, go to [email or web address] . Click on \"Log in\" on the left side of the screen, which will take you to the Welcome page. Then click on \"Sign up Now\" on the right side of the page.     You will be asked to enter the access code listed below, as well as some personal information. Please follow the directions to create your username and password.     Your access code is: [email or web address]  Expires: 2018 12:09 AM     Your access code will  in 90 days. If you need help or a new code, please call your [facility] or [phone].        Care EveryWhere ID     This is your Care EveryWhere ID. This could be " used by other organizations to access your [facility] medical records  [id number]        Your Vitals Were     Last Period                   2017            Blood Pressure from Last 3 Encounters:   17 147/79   12/10/17 103/56   11/10/17 103/63    Weight from Last 3 Encounters:   No data found for Wt              Today, you had the following     No orders found for display       Primary Care Provider Office Phone # Fax #    Sheryl Delgado [phone]       [address]  [facility] [address]        Equal Access to Services     [facility]: Hadii aad ku hadasho Soomaali, waaxda luqadaha, qaybta kaalmada adeegyada, waxay dianne hayaldan [facility] . So [facility] [phone].    ATENCIÓN: Si habla español, tiene a wilson disposición servicios gratuitos de asistencia lingüística. [facility] [phone].    We comply with applicable federal civil rights laws and [place] laws. We do not discriminate on the basis of race, color, national origin, age, disability, sex, sexual orientation, or gender identity.            Thank you!     Thank you for [place] [facility]  [place]. Our goal is always to provide you with excellent care. Hearing back from our patients is one way we can continue to improve our services. Please take a few minutes to complete the written survey that you may receive in the mail after your visit with us. Thank you!             Your Updated Medication List - Protect others around you: Learn how to safely use, store and throw away your medicines at www.disposemymeds.org.          This list is accurate as of: 17  1:53 PM.  Always use your most recent med list.                   Brand Name Dispense Instructions for use Diagnosis    acetaminophen 325 MG tablet    TYLENOL    100 tablet    Take 2 tablets (650 mg) by mouth every 4 hours as needed for mild pain     (spontaneous vaginal delivery)       ferrous sulfate 325 (65 FE) MG tablet    IRON     100 tablet    Take 1 tablet (325 mg) by mouth daily (with breakfast)    Labor and delivery, indication for care       ibuprofen 800 MG tablet    ADVIL/MOTRIN    90 tablet    Take 1 tablet (800 mg) by mouth every 6 hours as needed for other (cramping)    Labor and delivery, indication for care       prenatal multivitamin plus iron 27-0.8 MG Tabs per tablet      Take 1 tablet by mouth daily        senna-docusate 8.6-50 MG per tablet    SENOKOT-S;PERICOLACE    100 tablet    Take 1 tablet by mouth 2 times daily as needed for constipation    Labor and delivery, indication for care       VITAMIN D (CHOLECALCIFEROL) PO      Take 2,000 Units by mouth daily

## 2017-12-22 NOTE — TELEPHONE ENCOUNTER
Call to [facility] insurance, spoke with Keira who states if the medela dual electric breast pump was given to her by the hospital, it will be covered by insurance (will cover one breast pump every three years).     Lory Napier, EMMETTN RN  [facility]

## 2017-12-22 NOTE — TELEPHONE ENCOUNTER
Patient notified in clinic before she left--okay to use breast pump. Called now with , left a message to call to schedule follow up per provider message below    EMMETT EdmondsN RN  [facility]

## 2017-12-22 NOTE — PROGRESS NOTES
Initial Lactation Consultation    Baby:  Tish Glasgow         MRN:  [id number]  Mom:  Christine                     MRN:  [id number]    Consultation Date: 12/22/2017    HPI  Breastfeeding long-term goals: a year   Breastfeeding story ((how did nursing go right after birth, how is it going now, main concerns, etc):  Isn't latching on well  The baby will not latch or will latch but not suck.  In his first day he latched and sucked, but mom had no milk.  But the second day in the afternoon he stopped latching.  Hospital staff helped with latching and then mom asked to give baby formula due to crying.  Milk came in day 3.  Feels like she has milk.  Last ate today 10:30 am    Dad has returned to work, but works in the afternoon after kids are home from school.    Nursing not at all currently because he does not latch. Previously attempting to nursing on both side(s).  Trying every day to latch.  Nursing sessions last 20 minutes per side.    Nipple pain: yes, a little     PUMPING: Other: manual pumping  # times per day:  2 - getting 1 oz total for the day    SUPPLEMENTATION: formula since day 2 - 2 oz every 2 hours    Baby's OUTPUT:   A few stooled diapers with yellow mustard appearance    MOTHER      Breastfeeding History  Yes, 3 older children ages 17, 13, 11 years and successful, Length of Time: 1 year or so     Medical History  Non-contributory    Pregnancy History (any complications in this pregnancy)  No complications     Delivery History  Vaginal    Labor Meds/Anesthesia  None    Current Medications  none    Herbals:  None    ASSESSMENT OF MOTHER    Physical:   Breast appearance  Breast Size: average  Nipple Appearance - Left: intact  Nipple Appearance - Right: intact  Nipple Erectility - Left: flat  Nipple Erectility - Right: flat  Areolas Compressibility: soft  Nipple Size: average  Milk Supply: mature      BABY       Name: Lalit Birth Date: [birth year] Age: 2 weeks Gestational Age: born at 39 weeks    Doctor:  "[facility]    BABY'S WEIGHT HISTORY    Wt Readings from Last 5 Encounters:   12/22/17 8 lb (3.629 kg) (33 %)*   12/10/17 6 lb 8.9 oz (2.974 kg) (17 %)*     * Growth percentiles are based on WHO (Boys, 0-2 years) data.     Percentage wt. change from birth:       Since discharge from hospital, baby has a gain of 1.5 lbs in two weeks 24 oz in 14 days  Note: Normal weight gain is 1/2 to 1 oz/day in the first 6 months of life.    ASSESSMENT OF BABY    Physical:   Temp 97.8  F (36.6  C) (Axillary)  Ht 1' 8\" (0.508 m)  Wt 8 lb (3.629 kg)  HC 14\" (35.6 cm)  BMI 14.06 kg/m2    GENERAL: Alert, vigorous, is in no acute distress.  SKIN: skin is clear, no rash or abnormal pigmentation  HEAD: The head is normocephalic. The fontanels and sutures are normal  EYES: The eyes are normal. The conjunctivae and cornea normal.   NOSE: Clear, no discharge or congestion  MOUTH: The mouth is clear.  Very tight and thick lingual frenulum  NECK: The neck is supple and thyroid is normal, no masses  LYMPH NODES: No adenopathy  LUNGS: The lung fields are clear to auscultation,no rales, rhonchi, wheezing or retractions  HEART: The precordium is quiet. Rhythm is regular. S1 and S2 are normal. No murmurs.   ABDOMEN: The umbilicus is normal. The bowel sounds are normal. Abdomen soft, non tender,  non distended, no masses or hepatosplenomegaly.  NEUROLOGIC: Normal tone throughout.     Oral Anatomy  Mouth: normal  Palate: normal  Jaw: normal  Tongue: short   Lingual Frenulum: short and thick  Lip Frenulum: short  Digital Suck Exam: no root      FEEDING ASSESSMENT    Initial position and latch strategy observed: cross cradle right, wide open mouth with flanged lips, no sucking  Effort to Latch: awake and alert, latched easily  did not nurse  Nipple pain:  mild  Weight gain at breast:  none     INTERVENTIONS/EVALUATION:  Other: attempted breast compressions to stimulate suck at the breast - no suck noted      SUMMARY  Infant weight gain " "overshooting daily goal with formula.  Likely surpressing drive to nurse.  Advised paced feedings with bottles.    Infant with no appreciable suck since birth.  Does have thick short lingual frenulum.  Asked Dr. Muniz to evaluate and he felt we could try and do fenulotomy, but more likely than not it will not be helpful.  As we also need to increase supply, I advise against any procedure at this point.  May need to see ENT or peds dental to fully work on latch.    Minimal breast emptying since birth likely leading to lower milk supply, but not fully known due to no breastfeeding and limited pumping.  Advised intensive pumping schedule of 10 times a day, both breasts simultaneously with dual electric pump for 15 minutes.  Mom feels she can do about 7 times a day.  Also can take fenugreek.  Triage working on pump coverage at the time of this note    SNS - demonstrated use of tube and syringe to deliver EBM and formula at the breast.  Parents very interested in this.    RECOMMENDATIONS  Patient Instructions   Lalit gained 24 ounces in 14 days!  He doesn't need this much formula and it is likely making him less hungry and less interest in nursing at the breast  Feed him with \"paced feedings\"  https://My Sourcebox.Zayante/bf/pumpingmoms/feeding-tools/bottle-feeding/  https://www.[employer]ayante/paced-bottle-feeding/ - video with this one    You have the option to give Lalit the formula and any pumped breast milk to Lalit at the breast with a tube.    INCREASING MILK SUPPLY  1. I want you to pump both breasts - ideally with a electric pump and at the same time 10 times a day      Pumping after each breastfeeding    -for10-15 minutes    -doing \"mini pumps\" for a few minutes every 1 hr or so in between feedings without washing pump parts or putting milk in fridge-cover pump set with towel during this time.   Hints:      wash pump parts every 24 hours and store parts in the fridge between pumpings (often need to put milk in " "separate bottle for storage)    Pump right before you go to bed and again right after the first nursing in the am.     Breast massage and hand expression for 1-2 minutes before, during and after pumping completed to maximize milk production.   2. \"Hands on \" pumping - breast massage and hand expression before, during and after pumping to help breast stimulation-see website   Http://[email or web address]/Breastfeeding/[email or web address] - \"Hands on Pumping\"  Hand Expression-  Http://newborns.[place].edu/Breastfeeding/HandExpression.html - hand expression  4.  Hands free pumping allows you to do hands on pumping and care for your infant while pumping.  It also helps hold on the flanges for better body positioning.  You can make a \"hands free\" pumping bra by using an old bra and cutting out holes for the pump flanges to fit in. You can also use a tube top and make a slit or cut out holes for the pump flanges.  I also like the \"Pump Ease brand hands-free bra that you can find on Amazon or similar \"hook and eye\" type bandeau bra.   5. Fenugreek supplement for mother-  (to increase milk production):Fenugreek capsules: 3 capsules 3 times daily for 1-2 weeks. Can get  More Milk Plus at [facility] pharmacy or [employer]. Dosage range should be 1000-1500mg three times/day.   OR  Moringa/M[place] capsules (Go-Lacta is one brand) - dose range is 700-1050 mg 2-3 times a day  BONUS  1. Mother's Milk tea- 3 times/day   2. Omego 3 supplements if not in prenatal vitamins-for mother - -300mg daily  3. Oatmeal for mother-helps to increase milk supply- oatmeal cookies too!               Follow up: 2 weeks    70 minutes time spent face-to-face, 45 with mother and 25 with baby, with over 50% spent in counseling/coordination of care regarding breastfeeding goals, latch, nipple care, weight gain expectations, and pumping.     SUJATA Otero    "

## 2018-02-06 ENCOUNTER — OFFICE VISIT (OUTPATIENT)
Dept: FAMILY MEDICINE | Facility: CLINIC | Age: 38
End: 2018-02-06
Payer: MEDICAID

## 2018-02-06 VITALS
OXYGEN SATURATION: 97 % | SYSTOLIC BLOOD PRESSURE: 106 MMHG | WEIGHT: 128 LBS | RESPIRATION RATE: 16 BRPM | HEART RATE: 75 BPM | TEMPERATURE: 97.9 F | DIASTOLIC BLOOD PRESSURE: 73 MMHG

## 2018-02-06 DIAGNOSIS — Z97.5 NEXPLANON IN PLACE: ICD-10-CM

## 2018-02-06 DIAGNOSIS — Z30.017 ENCOUNTER FOR INITIAL PRESCRIPTION OF IMPLANTABLE SUBDERMAL CONTRACEPTIVE: Primary | ICD-10-CM

## 2018-02-06 DIAGNOSIS — Z30.017 INSERTION OF IMPLANTABLE SUBDERMAL CONTRACEPTIVE: ICD-10-CM

## 2018-02-06 PROBLEM — R82.71 GBS BACTERIURIA: Status: RESOLVED | Noted: 2017-11-11 | Resolved: 2018-02-06

## 2018-02-06 PROBLEM — O09.899 MATERNAL VARICELLA, NON-IMMUNE: Status: ACTIVE | Noted: 2017-05-18

## 2018-02-06 PROBLEM — G56.03 BILATERAL CARPAL TUNNEL SYNDROME: Status: ACTIVE | Noted: 2017-09-25

## 2018-02-06 PROBLEM — Z28.39 MATERNAL VARICELLA, NON-IMMUNE: Status: ACTIVE | Noted: 2017-05-18

## 2018-02-06 PROBLEM — E55.9 VITAMIN D DEFICIENCY: Status: ACTIVE | Noted: 2017-05-18

## 2018-02-06 PROBLEM — Z36.89 ENCOUNTER FOR TRIAGE IN PREGNANT PATIENT: Status: RESOLVED | Noted: 2017-11-10 | Resolved: 2018-02-06

## 2018-02-06 PROBLEM — Z37.9 NORMAL LABOR: Status: RESOLVED | Noted: 2017-12-08 | Resolved: 2018-02-06

## 2018-02-06 LAB — HCG UR QL: NEGATIVE

## 2018-02-06 NOTE — PROGRESS NOTES
Procedure Note - Etonogestrel Implant Insertion     HPI: Christine Glasgow is a patient of Sheryl Barrera here for Nexplanon/Implanon (etonogestrel implant) insertion.   Indication: unwanted fertility  LMP   1/24/18  STI history:   NO  Prev Contraception? none  Smoking?  No    Counselling and Consent:  Affirmation of informed consent was signed and scanned into the medical record. Risks, benefits and alternatives were discussed. Discussed potential side effects of the etonogestrel implant including the risk of irregular bleeding that may persist across the 3 yrs of use.  Instructed on use of condoms for STI prevention.  Patient's questions were elicited and answered.      Procedure safety checklist was completed:  Yes  Time Out (Pause for the Cause) completed: Yes    Labs: UPT negative    Preoperative Diagnosis:  Unwanted fertility  Postoperative Diagnosis:  same     Technique:   Skin prep Betadine  Anesthesia 1% lidocaine  Suture  No   EBL:   minimal  Complications: No  Tolerance:  Pt tolerated procedure well and was in stable condition.     Pt was positioned on exam table with left arm flexed and externally rotated. Area was marked for insertion 8cm frm the medial epicondyle along the sulcus between the biceps and triceps. Anesthesia provided at the insertion site and along the insertion track and then the area was prepped with betadine. Etonogestrel implant was then inserted subdermally in usual fashion. Provider and patient confirmed placement by palpating the device. Pressure dressing applied and procedure complete.     Follow up:  Pt was instructed to call if bleeding, severe pain or foul smell.  Instructed to remove pressure dressing after 24 hours, then may keep insertion site covered with a bandaid until it is healed.  Instructed that she requires removal or replacement of the device in 3 years.  Lot Number [id number]      Resident: Reyes More  Faculty: Joycelyn Lloyd MD present for  and supervised this entire procedure.

## 2018-02-06 NOTE — MR AVS SNAPSHOT
After Visit Summary   2/6/2018    Christine Glasgow    MRN: [id number]           Patient Information     Date Of Birth          1980        Visit Information        Provider Department      2/6/2018 2:20 PM Joycelyn Lloyd MD [facility]        Today's Diagnoses     Contraception    -  1      Care Instructions    Hoja sobre el Nexplanon (implante de progestina), para llevar a casa       El implante comienza a funcionar para prevenir el embarazo en 7 lorie.     Usted debería usar un método de respaldo alec los primeros 7 días después de que se lo coloquen.     El implante puede permanecer debajo de wilson piel por 4 años.     Fecha de remoción: ___2/2022_______ (en 4 años a partir del día de hoy)    Cosas que debe saber:    Los efectos secundarios comunes incluyen: sangrado Irregular. Trudy menstruaciones pueden cambiar. Usted podría tener más sangrado, menos sangrado o no tenerlo del todo; y trudy menstruaciones podrían ser más largas de lo normal.     Moretones e hinchazón en el sitio son normales en las primeras 24 horas. Mantenga los vendajes por 24 horas. Después de 24 horas, usted puede remover los vendajes y lolita deonte ducha o un baño.     Usted puede chequear el implante haciendo ligera presión con las yemas de los dedos sobre la piel donde el implante fue colocado. Usted debería sentir un pequeño tubo. Si no siente el implante, llame a wilson proveedor de naif.     Usted puede regresar a la escuela o a wilson trabajo después de wilson connie.    El implante NO la protege contra las infecciones de transmisión sexual (ETS). Usted debería usar condones de látex y/o protectores dentales para prevenir las ETS. La mayoría de personas se hacen pruebas para las ETS deonte vez al año.     Señales de alarma:  En la primera semana    Enrojecimiento, calor o secreción del sitio de inserción     Fiebre (>101 grados)    En cualquier momento    Sensación de embarazo (dolor en los pechos,  náusea)    Prueba casera de embarazo positiva     Sin embargo, los estudios realizados sobre el Implanon NO encontraron fallos.  Previno el embarazo el 100% de las veces!     Si usted tiene cualquiera de las señales mencionadas arriba, debería ser examinada por un profesional de la medicina. Usted puede llamarnos al [phone], o ir a wilson doctor o proveedor de naif.          Follow-ups after your visit        Who to contact     Please call your clinic at [phone] to:    Ask questions about your health    Make or cancel appointments    Discuss your medicines    Learn about your test results    Speak to your doctor   If you have compliments or concerns about an experience at your clinic, or if you wish to file a complaint, please contact [facility] Patient Relations at [phone] or email us at [email or web address]         Additional Information About Your Visit        [employer] Information     [employer] is an electronic gateway that provides easy, online access to your medical records. With [employer], you can request a clinic appointment, read your test results, renew a prescription or communicate with your care team.     To sign up for [employer] visit the website at [email or web address]   You will be asked to enter the access code listed below, as well as some personal information. Please follow the directions to create your username and password.     Your access code is: [email or web address]  Expires: 2018 12:09 AM     Your access code will  in 90 days. If you need help or a new code, please contact your [facility] or call [phone] for assistance.        Care EveryWhere ID     This is your Care EveryWhere ID. This could be used by other organizations to access your [facility] medical records  [id number]        Your Vitals Were     Pulse Temperature Respirations Last Period Pulse Oximetry       75 97.9  F (36.6  C) (Oral) 16 2017  97%        Blood Pressure from Last 3 Encounters:   18 106/73   17 147/79   12/10/17 103/56    Weight from Last 3 Encounters:   18 128 lb (58.1 kg)              We Performed the Following     HCG Qualitative Urine (UPT) ([facility])        Primary Care Provider Office Phone # Fax #    Sheryl Delgado [phone]       [address]  [facility] [address]        Equal Access to Services     YOUNG PATEL : Hadii aad ku hadasho Soomaali, waaxda luqadaha, qaybta kaalmada adeegyada, waxay idiin hayaan adeeg kharabrigitte lajarad . So [facility] [phone].    ATENCIÓN: Si habla español, tiene a wilson disposición servicios gratuitos de asistencia lingüística. [facility] [phone].    We comply with applicable federal civil rights laws and [place] laws. We do not discriminate on the basis of race, color, national origin, age, disability, sex, sexual orientation, or gender identity.            Thank you!     Thank you for choosing [facility]  for your care. Our goal is always to provide you with excellent care. Hearing back from our patients is one way we can continue to improve our services. Please take a few minutes to complete the written survey that you may receive in the mail after your visit with us. Thank you!             Your Updated Medication List - Protect others around you: Learn how to safely use, store and throw away your medicines at www.disposemymeds.org.          This list is accurate as of 18  3:07 PM.  Always use your most recent med list.                   Brand Name Dispense Instructions for use Diagnosis    acetaminophen 325 MG tablet    TYLENOL    100 tablet    Take 2 tablets (650 mg) by mouth every 4 hours as needed for mild pain     (spontaneous vaginal delivery)       ferrous sulfate 325 (65 FE) MG tablet    IRON    100 tablet    Take 1 tablet (325 mg) by mouth daily (with breakfast)    Labor and delivery, indication for care       ibuprofen  800 MG tablet    ADVIL/MOTRIN    90 tablet    Take 1 tablet (800 mg) by mouth every 6 hours as needed for other (cramping)    Labor and delivery, indication for care       prenatal multivitamin plus iron 27-0.8 MG Tabs per tablet      Take 1 tablet by mouth daily        senna-docusate 8.6-50 MG per tablet    SENOKOT-S;PERICOLACE    100 tablet    Take 1 tablet by mouth 2 times daily as needed for constipation    Labor and delivery, indication for care       VITAMIN D (CHOLECALCIFEROL) PO      Take 2,000 Units by mouth daily

## 2018-02-06 NOTE — PATIENT INSTRUCTIONS
Hoja sobre el Nexplanon (implante de progestina), para llevar a casa       El implante comienza a funcionar para prevenir el embarazo en 7 lorie.     Usted debería usar un método de respaldo alec los primeros 7 días después de que se lo coloquen.     El implante puede permanecer debajo de wilson piel por 4 años.     Fecha de remoción: ___2/2022_______ (en 4 años a partir del día de hoy)    Cosas que debe saber:    Los efectos secundarios comunes incluyen: sangrado Irregular. Trudy menstruaciones pueden cambiar. Usted podría tener más sangrado, menos sangrado o no tenerlo del todo; y trudy menstruaciones podrían ser más largas de lo normal.     Moretones e hinchazón en el sitio son normales en las primeras 24 horas. Mantenga los vendajes por 24 horas. Después de 24 horas, usted puede remover los vendajes y lolita deonte ducha o un baño.     Usted puede chequear el implante haciendo ligera presión con las yemas de los dedos sobre la piel donde el implante fue colocado. Usted debería sentir un pequeño tubo. Si no siente el implante, llame a wilson proveedor de naif.     Usted puede regresar a la escuela o a wilson trabajo después de wilson connie.    El implante NO la protege contra las infecciones de transmisión sexual (ETS). Usted debería usar condones de látex y/o protectores dentales para prevenir las ETS. La mayoría de personas se hacen pruebas para las ETS deonte vez al año.     Señales de alarma:  En la primera semana    Enrojecimiento, calor o secreción del sitio de inserción     Fiebre (>101 grados)    En cualquier momento    Sensación de embarazo (dolor en los pechos, náusea)    Prueba casera de embarazo positiva     Sin embargo, los estudios realizados sobre el Implanon NO encontraron fallos.  Previno el embarazo el 100% de las veces!     Si usted tiene cualquiera de las señales mencionadas arriba, debería ser examinada por un profesional de la medicina. Usted puede llamarnos al [phone], o ir a wilson doctor o proveedor de naif.

## 2018-02-06 NOTE — LETTER
2/6/2018      RE: Christine Glasgow  [address]  [facility] [address]           Procedure Note - Etonogestrel Implant Insertion     HPI: Christine Glasgow is a patient of Sheryl Barrera here for Nexplanon/Implanon (etonogestrel implant) insertion.   Indication: unwanted fertility  LMP   1/24/18  STI history:   NO  Prev Contraception? none  Smoking?  No    Counselling and Consent:  Affirmation of informed consent was signed and scanned into the medical record. Risks, benefits and alternatives were discussed. Discussed potential side effects of the etonogestrel implant including the risk of irregular bleeding that may persist across the 3 yrs of use.  Instructed on use of condoms for STI prevention.  Patient's questions were elicited and answered.      Procedure safety checklist was completed:  Yes  Time Out (Pause for the Cause) completed: Yes    Labs: UPT negative    Preoperative Diagnosis:  Unwanted fertility  Postoperative Diagnosis:  same     Technique:   Skin prep Betadine  Anesthesia 1% lidocaine  Suture  No   EBL:   minimal  Complications: No  Tolerance:  Pt tolerated procedure well and was in stable condition.     Pt was positioned on exam table with left arm flexed and externally rotated. Area was marked for insertion 8cm frm the medial epicondyle along the sulcus between the biceps and triceps. Anesthesia provided at the insertion site and along the insertion track and then the area was prepped with betadine. Etonogestrel implant was then inserted subdermally in usual fashion. Provider and patient confirmed placement by palpating the device. Pressure dressing applied and procedure complete.     Follow up:  Pt was instructed to call if bleeding, severe pain or foul smell.  Instructed to remove pressure dressing after 24 hours, then may keep insertion site covered with a bandaid until it is healed.  Instructed that she requires removal or replacement of the device in 3 years.  Lot  Number [id number]      Resident: Reyes More  Faculty: Joycelyn Lloyd MD present for and supervised this entire procedure.      Preceptor Attestation:   Patient seen and discussed with the resident. I was present for and supervised the entire procedure. Assessment and plan reviewed with resident and agreed upon.  Supervising Physician:  Joycelyn Lloyd MD  [facility] [employer]      Joycelyn Lloyd MD

## 2018-02-06 NOTE — PROGRESS NOTES
Preceptor Attestation:   Patient seen and discussed with the resident. I was present for and supervised the entire procedure. Assessment and plan reviewed with resident and agreed upon.  Supervising Physician:  Joycelyn Lloyd MD  [facility]

## 2018-06-01 ENCOUNTER — TELEPHONE (OUTPATIENT)
Dept: FAMILY MEDICINE | Facility: CLINIC | Age: 38
End: 2018-06-01

## 2018-06-01 NOTE — TELEPHONE ENCOUNTER
Panel Management Review      Patient has the following on her problem list: None      Composite cancer screening  Chart review shows that this patient is due/due soon for the following Pap Smear  Summary:    Patient is due/failing the following:   PAP    Action needed:   Patient needs office visit for pap smear.    Type of outreach:    Sent letter.    Questions for provider review:    None                                                                                                                                    Nic Samuels MA     Chart routed to none.

## 2018-06-01 NOTE — LETTER
June 1, 2018      Christine Glasgow  [address]  [facility] [address]        Dear Christine,    In order to ensure we are providing the best quality care, we have reviewed your chart and see that you are due for:    1. Pap smear    Please call the clinic at your earliest convenience to schedule an appointment.  If you have completed these please contact our office via phone or [employer] to update our records.  We would like to know the date (approximately month and year), the result, and ideally where the procedure was performed.    Thank you for trusting us with your health care.      Sincerely,    Care Team for SUJATA Otero;

## 2020-10-07 ENCOUNTER — APPOINTMENT (OUTPATIENT)
Dept: INTERPRETER SERVICES | Facility: CLINIC | Age: 40
End: 2020-10-07
Payer: MEDICAID

## 2020-10-22 ENCOUNTER — HOSPITAL ENCOUNTER (OUTPATIENT)
Dept: ULTRASOUND IMAGING | Facility: CLINIC | Age: 40
Discharge: HOME | End: 2020-10-22
Attending: MIDWIFE | Admitting: MIDWIFE
Payer: MEDICAID

## 2020-10-22 DIAGNOSIS — Z32.01 PREGNANCY TEST POSITIVE: ICD-10-CM

## 2020-10-22 PROCEDURE — 76801 OB US < 14 WKS SINGLE FETUS: CPT | Mod: 26 | Performed by: RADIOLOGY

## 2020-10-22 PROCEDURE — T1013 SIGN LANG/ORAL INTERPRETER: HCPCS | Mod: U3

## 2020-10-22 PROCEDURE — 76801 OB US < 14 WKS SINGLE FETUS: CPT

## 2020-10-26 ENCOUNTER — TRANSFERRED RECORDS (OUTPATIENT)
Dept: HEALTH INFORMATION MANAGEMENT | Facility: CLINIC | Age: 40
End: 2020-10-26

## 2020-10-26 ENCOUNTER — MEDICAL CORRESPONDENCE (OUTPATIENT)
Dept: HEALTH INFORMATION MANAGEMENT | Facility: CLINIC | Age: 40
End: 2020-10-26

## 2020-10-28 ENCOUNTER — HOSPITAL ENCOUNTER (OUTPATIENT)
Facility: CLINIC | Age: 40
End: 2020-10-28

## 2020-11-16 ENCOUNTER — APPOINTMENT (OUTPATIENT)
Dept: INTERPRETER SERVICES | Facility: CLINIC | Age: 40
End: 2020-11-16
Payer: MEDICAID

## 2020-11-24 ENCOUNTER — PRE VISIT (OUTPATIENT)
Dept: MATERNAL FETAL MEDICINE | Facility: CLINIC | Age: 40
End: 2020-11-24

## 2020-12-09 ENCOUNTER — OFFICE VISIT (OUTPATIENT)
Dept: MATERNAL FETAL MEDICINE | Facility: CLINIC | Age: 40
End: 2020-12-09
Attending: MIDWIFE
Payer: MEDICAID

## 2020-12-09 DIAGNOSIS — O26.90 PREGNANCY RELATED CONDITION, ANTEPARTUM: ICD-10-CM

## 2020-12-09 PROCEDURE — 999N000069 HC STATISTIC GENETIC COUNSELING, < 16 MIN: Performed by: GENETIC COUNSELOR, MS

## 2020-12-09 PROCEDURE — T1013 SIGN LANG/ORAL INTERPRETER: HCPCS | Mod: GT | Performed by: GENETIC COUNSELOR, MS

## 2020-12-09 NOTE — PROGRESS NOTES
[facility]  Genetic Counseling Consult    Patient: Christine Glasgow YOB: 1980   Date of Service: 12/09/20      Christine was evaluated via a billable telephone visit at [facility] for genetic consultation given advanced maternal age.    The patient has been notified of the following:  This telephone visit will be conducted via a call between you and your physician/provider. We have found that certain health care needs can be provided without the need for a physical exam. This service lets us provide the care you need with a short phone conversation. If a prescription is necessary we can send it directly to your pharmacy. If lab work is needed we can place an order for that and you can then stop by our lab to have the test done at a later time.     If during the course of the call the provider feels a telephone visit is not appropriate, you will not be charged for this service.        Impression/Plan:   1.  Christine was contacted for her virtual genetic counseling visit using  #[id number]. Of note, she did not complete her scheduled genetic counseling visit and nuchal translucency ultrasound on 11/25 when she would have been 13w4d gestation. She was not scheduled for an ultrasound today since she was out of the window for an NT ultrasound. No further ultrasounds have been ordered. I explained to Christine that that she is still able to have screening even without the ultrasound information. We briefly discussed that the NT ultrasound would have looked for specific markers that, if identified, can increase the chance for a chromosome condition or a heart defect. The screening that could be offered at this time can also screen for common chromosome conditions such as Down syndrome. Upon learning that she would not be having an ultrasound today, Christine declined further discussion and disconnected from the session.      If Christine would like screening in the future, or to have a more detailed discussion, I would be happy to facilitate.     Phone call contact time:    Call started at 2:15    Call ended at 2:30    Bree Rodas MS, [facility]  Licensed Genetic Counselor  [facility]  Maternal Fetal Medicine  [email or web address]  [phone]    Patient seen, evaluated and discussed with the Genetic Counseling Intern. I have verified the content of the note, which accurately reflects my assessment of the patient and the plan of care.  Supervising Genetic Counselor    Rosanne Currie MS, [facility]  Maternal Fetal Medicine  [facility]  Ph: [phone]  [email or web address]

## 2025-05-11 ENCOUNTER — HOSPITAL ENCOUNTER (EMERGENCY)
Facility: CLINIC | Age: 45
Discharge: HOME | End: 2025-05-11
Attending: EMERGENCY MEDICINE | Admitting: EMERGENCY MEDICINE
Payer: MEDICAID

## 2025-05-11 ENCOUNTER — APPOINTMENT (OUTPATIENT)
Dept: GENERAL RADIOLOGY | Facility: CLINIC | Age: 45
End: 2025-05-11
Attending: EMERGENCY MEDICINE
Payer: MEDICAID

## 2025-05-11 VITALS
RESPIRATION RATE: 16 BRPM | HEART RATE: 62 BPM | HEIGHT: 60 IN | WEIGHT: 140 LBS | OXYGEN SATURATION: 100 % | BODY MASS INDEX: 27.48 KG/M2 | TEMPERATURE: 97.2 F | DIASTOLIC BLOOD PRESSURE: 75 MMHG | SYSTOLIC BLOOD PRESSURE: 122 MMHG

## 2025-05-11 DIAGNOSIS — R06.02 SHORTNESS OF BREATH: ICD-10-CM

## 2025-05-11 LAB
ALBUMIN SERPL BCG-MCNC: 4.1 G/DL (ref 3.5–5.2)
ALP SERPL-CCNC: 85 U/L (ref 40–150)
ALT SERPL W P-5'-P-CCNC: 20 U/L (ref 0–50)
ANION GAP SERPL CALCULATED.3IONS-SCNC: 13 MMOL/L (ref 7–15)
AST SERPL W P-5'-P-CCNC: 16 U/L (ref 0–45)
ATRIAL RATE - MUSE: 56 BPM
BASOPHILS # BLD AUTO: 0 10E3/UL (ref 0–0.2)
BASOPHILS NFR BLD AUTO: 0 %
BILIRUB SERPL-MCNC: 1.6 MG/DL
BUN SERPL-MCNC: 11.4 MG/DL (ref 6–20)
CALCIUM SERPL-MCNC: 9 MG/DL (ref 8.8–10.4)
CHLORIDE SERPL-SCNC: 102 MMOL/L (ref 98–107)
CREAT SERPL-MCNC: 0.43 MG/DL (ref 0.51–0.95)
DIASTOLIC BLOOD PRESSURE - MUSE: NORMAL MMHG
EGFRCR SERPLBLD CKD-EPI 2021: >90 ML/MIN/1.73M2
EOSINOPHIL # BLD AUTO: 0.2 10E3/UL (ref 0–0.7)
EOSINOPHIL NFR BLD AUTO: 2 %
ERYTHROCYTE [DISTWIDTH] IN BLOOD BY AUTOMATED COUNT: 11.6 % (ref 10–15)
GLUCOSE SERPL-MCNC: 129 MG/DL (ref 70–99)
HCO3 SERPL-SCNC: 21 MMOL/L (ref 22–29)
HCT VFR BLD AUTO: 38 % (ref 35–47)
HGB BLD-MCNC: 13.5 G/DL (ref 11.7–15.7)
IMM GRANULOCYTES # BLD: 0 10E3/UL
IMM GRANULOCYTES NFR BLD: 0 %
INTERPRETATION ECG - MUSE: NORMAL
LIPASE SERPL-CCNC: 54 U/L (ref 13–60)
LYMPHOCYTES # BLD AUTO: 2.2 10E3/UL (ref 0.8–5.3)
LYMPHOCYTES NFR BLD AUTO: 24 %
MCH RBC QN AUTO: 31 PG (ref 26.5–33)
MCHC RBC AUTO-ENTMCNC: 35.5 G/DL (ref 31.5–36.5)
MCV RBC AUTO: 87 FL (ref 78–100)
MONOCYTES # BLD AUTO: 0.7 10E3/UL (ref 0–1.3)
MONOCYTES NFR BLD AUTO: 7 %
NEUTROPHILS # BLD AUTO: 6.2 10E3/UL (ref 1.6–8.3)
NEUTROPHILS NFR BLD AUTO: 67 %
NRBC # BLD AUTO: 0 10E3/UL
NRBC BLD AUTO-RTO: 0 /100
P AXIS - MUSE: 51 DEGREES
PLATELET # BLD AUTO: 220 10E3/UL (ref 150–450)
POTASSIUM SERPL-SCNC: 3.9 MMOL/L (ref 3.4–5.3)
PR INTERVAL - MUSE: 158 MS
PROT SERPL-MCNC: 7.4 G/DL (ref 6.4–8.3)
QRS DURATION - MUSE: 84 MS
QT - MUSE: 448 MS
QTC - MUSE: 432 MS
R AXIS - MUSE: 65 DEGREES
RBC # BLD AUTO: 4.35 10E6/UL (ref 3.8–5.2)
SODIUM SERPL-SCNC: 136 MMOL/L (ref 135–145)
SYSTOLIC BLOOD PRESSURE - MUSE: NORMAL MMHG
T AXIS - MUSE: 57 DEGREES
TROPONIN T SERPL HS-MCNC: <6 NG/L
VENTRICULAR RATE- MUSE: 56 BPM
WBC # BLD AUTO: 9.3 10E3/UL (ref 4–11)

## 2025-05-11 PROCEDURE — 99284 EMERGENCY DEPT VISIT MOD MDM: CPT | Performed by: EMERGENCY MEDICINE

## 2025-05-11 PROCEDURE — 93010 ELECTROCARDIOGRAM REPORT: CPT | Performed by: EMERGENCY MEDICINE

## 2025-05-11 PROCEDURE — 250N000009 HC RX 250: Performed by: EMERGENCY MEDICINE

## 2025-05-11 PROCEDURE — 99285 EMERGENCY DEPT VISIT HI MDM: CPT | Mod: 25 | Performed by: EMERGENCY MEDICINE

## 2025-05-11 PROCEDURE — 84450 TRANSFERASE (AST) (SGOT): CPT | Performed by: EMERGENCY MEDICINE

## 2025-05-11 PROCEDURE — 250N000013 HC RX MED GY IP 250 OP 250 PS 637: Performed by: EMERGENCY MEDICINE

## 2025-05-11 PROCEDURE — 71046 X-RAY EXAM CHEST 2 VIEWS: CPT

## 2025-05-11 PROCEDURE — 83690 ASSAY OF LIPASE: CPT | Performed by: EMERGENCY MEDICINE

## 2025-05-11 PROCEDURE — 84484 ASSAY OF TROPONIN QUANT: CPT | Performed by: EMERGENCY MEDICINE

## 2025-05-11 PROCEDURE — 85025 COMPLETE CBC W/AUTO DIFF WBC: CPT | Performed by: EMERGENCY MEDICINE

## 2025-05-11 PROCEDURE — 93005 ELECTROCARDIOGRAM TRACING: CPT | Performed by: EMERGENCY MEDICINE

## 2025-05-11 PROCEDURE — 94640 AIRWAY INHALATION TREATMENT: CPT | Performed by: EMERGENCY MEDICINE

## 2025-05-11 PROCEDURE — 36415 COLL VENOUS BLD VENIPUNCTURE: CPT | Performed by: EMERGENCY MEDICINE

## 2025-05-11 RX ORDER — IPRATROPIUM BROMIDE AND ALBUTEROL SULFATE 2.5; .5 MG/3ML; MG/3ML
3 SOLUTION RESPIRATORY (INHALATION) ONCE
Status: COMPLETED | OUTPATIENT
Start: 2025-05-11 | End: 2025-05-11

## 2025-05-11 RX ORDER — IBUPROFEN 600 MG/1
600 TABLET, FILM COATED ORAL ONCE
Status: COMPLETED | OUTPATIENT
Start: 2025-05-11 | End: 2025-05-11

## 2025-05-11 RX ADMIN — IPRATROPIUM BROMIDE AND ALBUTEROL SULFATE 3 ML: .5; 3 SOLUTION RESPIRATORY (INHALATION) at 00:58

## 2025-05-11 RX ADMIN — IBUPROFEN 600 MG: 600 TABLET ORAL at 03:44

## 2025-05-11 ASSESSMENT — ACTIVITIES OF DAILY LIVING (ADL)
ADLS_ACUITY_SCORE: 41

## 2025-05-11 ASSESSMENT — COLUMBIA-SUICIDE SEVERITY RATING SCALE - C-SSRS
1. IN THE PAST MONTH, HAVE YOU WISHED YOU WERE DEAD OR WISHED YOU COULD GO TO SLEEP AND NOT WAKE UP?: NO
2. HAVE YOU ACTUALLY HAD ANY THOUGHTS OF KILLING YOURSELF IN THE PAST MONTH?: NO
6. HAVE YOU EVER DONE ANYTHING, STARTED TO DO ANYTHING, OR PREPARED TO DO ANYTHING TO END YOUR LIFE?: NO

## 2025-05-11 NOTE — ED TRIAGE NOTES
Pt has chest pain and shortness of breath starting at 11pm tonight. Hx of asthma. Does not have inhailor     Triage Assessment (Adult)       Row Name 05/11/25 0026          Triage Assessment    Airway WDL WDL        Respiratory WDL    Respiratory WDL WDL        Skin Circulation/Temperature WDL    Skin Circulation/Temperature WDL WDL        Cardiac WDL    Cardiac WDL chest pain        Chest Pain Assessment    Character pressure     Chest Pain Intervention 12-lead ECG obtained        Peripheral/Neurovascular WDL    Peripheral Neurovascular WDL WDL        Cognitive/Neuro/Behavioral WDL    Cognitive/Neuro/Behavioral WDL WDL

## 2025-05-11 NOTE — DISCHARGE INSTRUCTIONS
Thank you for your patience today.  Please follow-up with your regular doctor in the next 2-3 days for further evaluation and follow-up care.  Please call to schedule an appointment.  Please continue your own medications.  Please use inhaler as needed.  Please return to the ER if you develop high fever, recurrent/worsening shortness of breath, chest pain, or any worsening  of your current symptoms.  It was a pleasure taking care of you today.  We hope you feel better soon.

## 2025-05-11 NOTE — ED PROVIDER NOTES
[facility] [place] EMERGENCY DEPARTMENT ([facility])    5/11/25          History     Chief Complaint   Patient presents with    Shortness of Breath     Pt has chest pain and shortness of breath starting at 11pm tonight. Hx of asthma. Does not have inhailor     HPI  Christine Glasgow is a 44 year old female who past medical history of GERD, vitamin D deficiency, bilateral carpal tunnel syndrome, allergies and bacterial vaginosis presents to the ED due to shortness of breath.    Patient states that all day today she was feeling fine but at around 11 it became difficult for her to breathe.  Patient states that she had just finished eating and she did not eat anything unusual.  Her symptoms are associated with abdominal pain and cough.  Patient states that this difficulty in her breathing is different from her usual asthmatic symptoms.  Patient is on hormonal treatment IUD.  Patient is allergic to cats and dogs.    She denies chest pain, nausea, vomiting, fevers, chills, pain in legs, major medical problems, recent pregnancy, and anyone around her being sick with cold or flu.     Past Medical History  No past medical history on file.  No past surgical history on file.  acetaminophen (TYLENOL) 325 MG tablet  etonogestrel (IMPLANON/NEXPLANON) 68 MG IMPL  ferrous sulfate (IRON) 325 (65 FE) MG tablet  ibuprofen (ADVIL/MOTRIN) 800 MG tablet  Prenatal Vit-Fe Fumarate-FA (PRENATAL MULTIVITAMIN PLUS IRON) 27-0.8 MG TABS per tablet  senna-docusate (SENOKOT-S;PERICOLACE) 8.6-50 MG per tablet  VITAMIN D, CHOLECALCIFEROL, PO      Allergies   Allergen Reactions    Ciprofloxacin Hives     Family History  No family history on file.  Social History   Social History     Tobacco Use    Smoking status: Never    Smokeless tobacco: Never   Substance Use Topics    Alcohol use: No    Drug use: No      Past medical history, past surgical history, medications, allergies, family history, and social history were reviewed with the patient. No  additional pertinent items.   A complete review of systems was performed with pertinent positives and negatives noted in the HPI, and all other systems negative.    Physical Exam   BP: 122/75  Pulse: 62  Temp: 97.2  F (36.2  C)  Resp: 16  Height: 152.4 cm (5')  Weight: 63.5 kg (140 lb)  SpO2: 100 %  Physical Exam  .General: Afebrile, no acute distress   HEENT: Normocephalic, atraumatic, conjunctivae normal. MMM  Neck: non-tender, supple  Cardio: regular rate. regular rhythm   Resp: Normal work of breathing, no respiratory distress, lungs clear bilaterally, no wheezing, rhonchi, rales  Chest/Back: no visual signs of trauma, no CVA tenderness   Abdomen: soft, non distension, no tenderness, no peritoneal signs   Neuro: alert and fully oriented. CN II-XII grossly intact. Grossly normal strength and sensation in all extremities.   MSK: no deformities. Normal range of motion, no lower extremity edema or calf tenderness  Integumentary/Skin: no rash visualized, normal color  Psych: normal affect, normal behavior      ED Course, Procedures, & Data      Procedures  .Interpreted by Alem Shah MD  Time reviewed: 0030  Symptoms at time of EKG: chest pain   Rhythm: sinus bradycardia  Rate: 56  Axis: normal  Ectopy: none  Conduction: normal  ST Segments/ T Waves: No acute ischemic change  Q Waves: none  Comparison to prior: No old EKG available    Clinical Impression: Sinus bradycardia with no acute ischemic change       Results for orders placed or performed during the hospital encounter of 05/11/25   XR Chest 2 Views     Status: None    Narrative    EXAM: XR CHEST 2 VIEWS  LOCATION: [facility]  DATE: 5/11/2025    INDICATION: shortness of breath  COMPARISON: None.      Impression    IMPRESSION: Negative chest.   Comprehensive metabolic panel     Status: Abnormal   Result Value Ref Range    Sodium 136 135 - 145 mmol/L    Potassium 3.9 3.4 - 5.3 mmol/L    Carbon Dioxide (CO2)  21 (L) 22 - 29 mmol/L    Anion Gap 13 7 - 15 mmol/L    Urea Nitrogen 11.4 6.0 - 20.0 mg/dL    Creatinine 0.43 (L) 0.51 - 0.95 mg/dL    GFR Estimate >90 >60 mL/min/1.73m2    Calcium 9.0 8.8 - 10.4 mg/dL    Chloride 102 98 - 107 mmol/L    Glucose 129 (H) 70 - 99 mg/dL    Alkaline Phosphatase 85 40 - 150 U/L    AST 16 0 - 45 U/L    ALT 20 0 - 50 U/L    Protein Total 7.4 6.4 - 8.3 g/dL    Albumin 4.1 3.5 - 5.2 g/dL    Bilirubin Total 1.6 (H) <=1.2 mg/dL   Lipase     Status: Normal   Result Value Ref Range    Lipase 54 13 - 60 U/L   Troponin T, High Sensitivity     Status: Normal   Result Value Ref Range    Troponin T, High Sensitivity <6 <=14 ng/L   CBC with platelets and differential     Status: None   Result Value Ref Range    WBC Count 9.3 4.0 - 11.0 10e3/uL    RBC Count 4.35 3.80 - 5.20 10e6/uL    Hemoglobin 13.5 11.7 - 15.7 g/dL    Hematocrit 38.0 35.0 - 47.0 %    MCV 87 78 - 100 fL    MCH 31.0 26.5 - 33.0 pg    MCHC 35.5 31.5 - 36.5 g/dL    RDW 11.6 10.0 - 15.0 %    Platelet Count 220 150 - 450 10e3/uL    % Neutrophils 67 %    % Lymphocytes 24 %    % Monocytes 7 %    % Eosinophils 2 %    % Basophils 0 %    % Immature Granulocytes 0 %    NRBCs per 100 WBC 0 <1 /100    Absolute Neutrophils 6.2 1.6 - 8.3 10e3/uL    Absolute Lymphocytes 2.2 0.8 - 5.3 10e3/uL    Absolute Monocytes 0.7 0.0 - 1.3 10e3/uL    Absolute Eosinophils 0.2 0.0 - 0.7 10e3/uL    Absolute Basophils 0.0 0.0 - 0.2 10e3/uL    Absolute Immature Granulocytes 0.0 <=0.4 10e3/uL    Absolute NRBCs 0.0 10e3/uL   EKG 12 lead     Status: None (Preliminary result)   Result Value Ref Range    Systolic Blood Pressure  mmHg    Diastolic Blood Pressure  mmHg    Ventricular Rate 56 BPM    Atrial Rate 56 BPM    OR Interval 158 ms    QRS Duration 84 ms     ms    QTc 432 ms    P Axis 51 degrees    R AXIS 65 degrees    T Axis 57 degrees    Interpretation ECG Sinus bradycardia  Otherwise normal ECG      CBC with platelets differential     Status: None    Narrative     The following orders were created for panel order CBC with platelets differential.  Procedure                               Abnormality         Status                     ---------                               -----------         ------                     CBC with platelets and ...[[id number]]                      Final result                 Please view results for these tests on the individual orders.     Medications   ipratropium - albuterol 0.5 mg/2.5 mg/3 mL (DUONEB) neb solution 3 mL (3 mLs Nebulization $Given 5/11/25 0058)     Labs Ordered and Resulted from Time of ED Arrival to Time of ED Departure   COMPREHENSIVE METABOLIC PANEL - Abnormal       Result Value    Sodium 136      Potassium 3.9      Carbon Dioxide (CO2) 21 (*)     Anion Gap 13      Urea Nitrogen 11.4      Creatinine 0.43 (*)     GFR Estimate >90      Calcium 9.0      Chloride 102      Glucose 129 (*)     Alkaline Phosphatase 85      AST 16      ALT 20      Protein Total 7.4      Albumin 4.1      Bilirubin Total 1.6 (*)    LIPASE - Normal    Lipase 54     TROPONIN T, HIGH SENSITIVITY - Normal    Troponin T, High Sensitivity <6     CBC WITH PLATELETS AND DIFFERENTIAL    WBC Count 9.3      RBC Count 4.35      Hemoglobin 13.5      Hematocrit 38.0      MCV 87      MCH 31.0      MCHC 35.5      RDW 11.6      Platelet Count 220      % Neutrophils 67      % Lymphocytes 24      % Monocytes 7      % Eosinophils 2      % Basophils 0      % Immature Granulocytes 0      NRBCs per 100 WBC 0      Absolute Neutrophils 6.2      Absolute Lymphocytes 2.2      Absolute Monocytes 0.7      Absolute Eosinophils 0.2      Absolute Basophils 0.0      Absolute Immature Granulocytes 0.0      Absolute NRBCs 0.0       XR Chest 2 Views   Final Result   IMPRESSION: Negative chest.             Critical care was not performed.     Medical Decision Making  The patient's presentation was of high complexity (an acute health issue posing potential threat to life or bodily  function).    The patient's evaluation involved:  an assessment requiring an independent historian ()  ordering and/or review of 3+ test(s) in this encounter (see separate area of note for details)  independent interpretation of testing performed by another health professional (chest x-ray)    The patient's management necessitated moderate risk (prescription drug management including medications given in the ED) and high risk (a decision regarding hospitalization).    Assessment & Plan    Christine Glasgow is a 44 year old female who past medical history of GERD, vitamin D deficiency, bilateral carpal tunnel syndrome, allergies and bacterial vaginosis presents to the ED due to shortness of breath.  Upon arrival patient is nontoxic-appearing, afebrile, no distress.  Patient with no tachycardia, no hypoxia, no tachypnea, no respiratory distress.  Lungs clear to auscultation on examination.  Differential diagnosis includes but is not limited to ACS versus atypical chest pain versus asthma exacerbation versus upper respiratory infection versus pneumonia versus anxiety versus allergies among others.    Patient was treated with DuoNeb upon arrival.  I reviewed EKG which demonstrates sinus bradycardia with a ventricular rate of 56 bpm with no acute ischemic change.  No prior EKG to compare to.  Comprehensive labs unremarkable with no leukocytosis white blood cell count 9.3, hemoglobin 13.5, no acute metabolic or electrolyte abnormality, no transaminitis, normal lipase, high sensitive troponin less than 6.  I personally reviewed and interpreted chest x-ray which is unremarkable with no focal infiltrate, pleural effusion, pneumothorax.    Low suspicious for ACS given EKG, troponin within normal limits.  Patient with no risk factors for PE, no tachycardia, hypoxia, patient is PERC negative, no further workup at this time.  Chest x-ray with no evidence of infection/effusion/pneumothorax.  Overall ED workup  unremarkable and on reevaluation patient resting comfortably, sleeping, no distress.  Lungs remain clear and vital signs remained stable.  Patient does report improvement of symptoms at this time feel safe for discharge home with close outpatient follow-up.  Recommend inhaler as needed.  Strict return precautions discussed.  Patient understands and agrees to the plan.    I have reviewed the nursing notes. I have reviewed the findings, diagnosis, plan and need for follow up with the patient.    New Prescriptions    No medications on file       Final diagnoses:   Shortness of breath     I, BRITTANI BAIG, am serving as a trained medical scribe to document services personally performed by Alem Shah MD, based on the provider's statements to me.     I, Alem Shah MD, was physically present and have reviewed and verified the accuracy of this note documented by BRITTANI BAIG.     Alem Shah MD.  [facility] EMERGENCY DEPARTMENT  5/11/2025     Alem Shah MD  05/11/25 0257

## 2025-06-02 LAB
ATRIAL RATE - MUSE: 56 BPM
DIASTOLIC BLOOD PRESSURE - MUSE: NORMAL MMHG
INTERPRETATION ECG - MUSE: NORMAL
P AXIS - MUSE: 51 DEGREES
PR INTERVAL - MUSE: 158 MS
QRS DURATION - MUSE: 84 MS
QT - MUSE: 448 MS
QTC - MUSE: 432 MS
R AXIS - MUSE: 65 DEGREES
SYSTOLIC BLOOD PRESSURE - MUSE: NORMAL MMHG
T AXIS - MUSE: 57 DEGREES
VENTRICULAR RATE- MUSE: 56 BPM